# Patient Record
Sex: FEMALE | Race: WHITE | NOT HISPANIC OR LATINO | Employment: FULL TIME | URBAN - METROPOLITAN AREA
[De-identification: names, ages, dates, MRNs, and addresses within clinical notes are randomized per-mention and may not be internally consistent; named-entity substitution may affect disease eponyms.]

---

## 2017-11-17 ENCOUNTER — TRANSCRIBE ORDERS (OUTPATIENT)
Dept: ADMINISTRATIVE | Facility: HOSPITAL | Age: 62
End: 2017-11-17

## 2017-11-17 ENCOUNTER — HOSPITAL ENCOUNTER (OUTPATIENT)
Dept: RADIOLOGY | Facility: HOSPITAL | Age: 62
Discharge: HOME | End: 2017-11-17
Payer: COMMERCIAL

## 2017-11-17 DIAGNOSIS — M54.5 LOW BACK PAIN, UNSPECIFIED BACK PAIN LATERALITY, UNSPECIFIED CHRONICITY, WITH SCIATICA PRESENCE UNSPECIFIED: ICD-10-CM

## 2017-11-17 DIAGNOSIS — M53.1 CERVICOBRACHIAL SYNDROME: ICD-10-CM

## 2017-11-17 DIAGNOSIS — R20.2 TINGLING: Primary | ICD-10-CM

## 2017-11-17 DIAGNOSIS — R20.2 TINGLING: ICD-10-CM

## 2017-11-17 PROCEDURE — 73522 X-RAY EXAM HIPS BI 3-4 VIEWS: CPT

## 2017-11-17 PROCEDURE — 72100 X-RAY EXAM L-S SPINE 2/3 VWS: CPT

## 2017-11-17 PROCEDURE — 72052 X-RAY EXAM NECK SPINE 6/>VWS: CPT

## 2020-01-31 ENCOUNTER — OFFICE VISIT (OUTPATIENT)
Dept: URGENT CARE | Facility: CLINIC | Age: 65
End: 2020-01-31
Payer: COMMERCIAL

## 2020-01-31 VITALS
RESPIRATION RATE: 20 BRPM | DIASTOLIC BLOOD PRESSURE: 86 MMHG | HEART RATE: 78 BPM | SYSTOLIC BLOOD PRESSURE: 110 MMHG | TEMPERATURE: 97.3 F | OXYGEN SATURATION: 99 % | BODY MASS INDEX: 24.4 KG/M2 | HEIGHT: 68 IN | WEIGHT: 161 LBS

## 2020-01-31 DIAGNOSIS — J01.00 ACUTE NON-RECURRENT MAXILLARY SINUSITIS: Primary | ICD-10-CM

## 2020-01-31 PROCEDURE — 99213 OFFICE O/P EST LOW 20 MIN: CPT | Performed by: NURSE PRACTITIONER

## 2020-01-31 RX ORDER — AMOXICILLIN AND CLAVULANATE POTASSIUM 875; 125 MG/1; MG/1
1 TABLET, FILM COATED ORAL EVERY 12 HOURS SCHEDULED
Qty: 14 TABLET | Refills: 0 | Status: SHIPPED | OUTPATIENT
Start: 2020-01-31 | End: 2020-02-07

## 2020-01-31 RX ORDER — FLUTICASONE PROPIONATE 50 MCG
2 SPRAY, SUSPENSION (ML) NASAL DAILY
COMMUNITY
Start: 2012-03-12

## 2020-01-31 NOTE — PATIENT INSTRUCTIONS
Augmentin twice a day for 7 days  Flonase 1 spray each nostril daily  Saline nasal spray as directed to rinse sinus passages  Pseudoephedrine 1-2 tablets every 6 hours as needed for congestion  Increase your fluid intake  Tylenol and/or Motrin as needed for pain or fever  Follow up with your PCP for worsening or concerning symptoms    Sinusitis   WHAT YOU NEED TO KNOW:   Sinusitis is inflammation or infection of your sinuses  It is most often caused by a virus  Acute sinusitis may last up to 12 weeks  Chronic sinusitis lasts longer than 12 weeks  Recurrent sinusitis means you have 4 or more times in 1 year  DISCHARGE INSTRUCTIONS:   Return to the emergency department if:   · Your eye and eyelid are red, swollen, and painful  · You cannot open your eye  · You have vision changes, such as double vision  · Your eyeball bulges out or you cannot move your eye  · You are more sleepy than normal, or you notice changes in your ability to think, move, or talk  · You have a stiff neck, a fever, or a bad headache  · You have swelling of your forehead or scalp  Contact your healthcare provider if:   · Your symptoms do not improve after 3 days  · Your symptoms do not go away after 10 days  · You have nausea and are vomiting  · Your nose is bleeding  · You have questions or concerns about your condition or care  Medicines: Your symptoms may go away on their own  Your healthcare provider may recommend watchful waiting for up to 10 days before starting antibiotics  You may  need any of the following:  · Acetaminophen  decreases pain and fever  It is available without a doctor's order  Ask how much to take and how often to take it  Follow directions  Read the labels of all other medicines you are using to see if they also contain acetaminophen, or ask your doctor or pharmacist  Acetaminophen can cause liver damage if not taken correctly   Do not use more than 4 grams (4,000 milligrams) total of acetaminophen in one day  · NSAIDs , such as ibuprofen, help decrease swelling, pain, and fever  This medicine is available with or without a doctor's order  NSAIDs can cause stomach bleeding or kidney problems in certain people  If you take blood thinner medicine, always ask your healthcare provider if NSAIDs are safe for you  Always read the medicine label and follow directions  · Nasal steroid sprays  may help decrease inflammation in your nose and sinuses  · Decongestants  help reduce swelling and drain mucus in the nose and sinuses  They may help you breathe easier  · Antihistamines  help dry mucus in the nose and relieve sneezing  · Antibiotics  help treat or prevent a bacterial infection  · Take your medicine as directed  Contact your healthcare provider if you think your medicine is not helping or if you have side effects  Tell him or her if you are allergic to any medicine  Keep a list of the medicines, vitamins, and herbs you take  Include the amounts, and when and why you take them  Bring the list or the pill bottles to follow-up visits  Carry your medicine list with you in case of an emergency  Self-care:   · Rinse your sinuses  Use a sinus rinse device to rinse your nasal passages with a saline (salt water) solution or distilled water  Do not use tap water  This will help thin the mucus in your nose and rinse away pollen and dirt  It will also help reduce swelling so you can breathe normally  Ask your healthcare provider how often to do this  · Breathe in steam   Heat a bowl of water until you see steam  Lean over the bowl and make a tent over your head with a large towel  Breathe deeply for about 20 minutes  Be careful not to get too close to the steam or burn yourself  Do this 3 times a day  You can also breathe deeply when you take a hot shower  · Sleep with your head elevated    Place an extra pillow under your head before you go to sleep to help your sinuses drain      · Drink liquids as directed  Ask your healthcare provider how much liquid to drink each day and which liquids are best for you  Liquids will thin the mucus in your nose and help it drain  Avoid drinks that contain alcohol or caffeine  · Do not smoke, and avoid secondhand smoke  Nicotine and other chemicals in cigarettes and cigars can make your symptoms worse  Ask your healthcare provider for information if you currently smoke and need help to quit  E-cigarettes or smokeless tobacco still contain nicotine  Talk to your healthcare provider before you use these products  Prevent the spread of germs that cause sinusitis:  Wash your hands often with soap and water  Wash your hands after you use the bathroom, change a child's diaper, or sneeze  Wash your hands before you prepare or eat food  Follow up with your healthcare provider as directed: You may be referred to an ear, nose, and throat specialist  Write down your questions so you remember to ask them during your visits  © 2017 [facility] Information is for End User's use only and may not be sold, redistributed or otherwise used for commercial purposes  All illustrations and images included in CareNotes® are the copyrighted property of [employer]  or Victorino Serrano  The above information is an  only  It is not intended as medical advice for individual conditions or treatments  Talk to your doctor, nurse or pharmacist before following any medical regimen to see if it is safe and effective for you

## 2020-01-31 NOTE — PROGRESS NOTES
[facility]        NAME: Sanjay Manrique is a 71 y o  female  : 1955    MRN: [id number]  DATE: 2020  TIME: 11:14 AM    Assessment and Plan   Acute non-recurrent maxillary sinusitis [J01 00]  1  Acute non-recurrent maxillary sinusitis  amoxicillin-clavulanate (AUGMENTIN) 875-125 mg per tablet         Patient Instructions   Augmentin twice a day for 7 days  Flonase 1 spray each nostril daily  Saline nasal spray as directed to rinse sinus passages  Pseudoephedrine 1-2 tablets every 6 hours as needed for congestion  Increase your fluid intake  Tylenol and/or Motrin as needed for pain or fever  Follow up with your PCP for worsening or concerning symptoms      Follow up with PCP in 3-5 days  Proceed to  ER if symptoms worsen  Chief Complaint     Chief Complaint   Patient presents with    Nasal Congestion     x 3 weeks    Sinusitis     x 3 weeks, tried aleve d, sudafed d, mucinex, flonase, and advil    Sore Throat    Headache         History of Present Illness       Patient is a 79-year-old female presenting with a 3 week history of congestion, rhinorrhea, sore throat, headache, and alternating bilateral ear aches  She has a cough that is occasionally moist and productive with green sputum  Denies fever or chills  He has been using over-the-counter medications including Aleve, Sudafed, Mucinex, Advil cold and Sinus without improvement  Review of Systems   Review of Systems   Constitutional: Negative for activity change, chills and fever  HENT: Positive for ear pain, postnasal drip, rhinorrhea, sinus pressure, sinus pain and sore throat  Negative for ear discharge  Respiratory: Positive for cough  Negative for shortness of breath  Gastrointestinal: Negative for abdominal pain, diarrhea, nausea and vomiting  Skin: Negative for rash  Neurological: Positive for headaches           Current Medications       Current Outpatient Medications:     fluticasone (FLONASE) 50 mcg/act nasal spray, 2 sprays into each nostril daily, Disp: , Rfl:     amoxicillin-clavulanate (AUGMENTIN) 875-125 mg per tablet, Take 1 tablet by mouth every 12 (twelve) hours for 7 days, Disp: 14 tablet, Rfl: 0    Current Allergies     Allergies as of 01/31/2020 - Reviewed 01/31/2020   Allergen Reaction Noted    Sulfa antibiotics  07/16/2014            The following portions of the patient's history were reviewed and updated as appropriate: allergies, current medications, past family history, past medical history, past social history, past surgical history and problem list      History reviewed  No pertinent past medical history  Past Surgical History:   Procedure Laterality Date    KNEE SURGERY Left     SHOULDER SURGERY Right        History reviewed  No pertinent family history  Medications have been verified  Objective   /86   Pulse 78   Temp (!) 97 3 °F (36 3 °C)   Resp 20   Ht 5' 8" (1 727 m)   Wt 73 kg (161 lb)   SpO2 99%   BMI 24 48 kg/m²        Physical Exam     Physical Exam   Constitutional: She is oriented to person, place, and time  Vital signs are normal  She appears well-developed and well-nourished  She is active  She does not appear ill  HENT:   Head: Normocephalic  Right Ear: Hearing, tympanic membrane, external ear and ear canal normal    Left Ear: Hearing, tympanic membrane, external ear and ear canal normal    Nose: Mucosal edema and rhinorrhea present  Right sinus exhibits maxillary sinus tenderness  Left sinus exhibits maxillary sinus tenderness  Mouth/Throat: Uvula is midline, oropharynx is clear and moist and mucous membranes are normal    Cardiovascular: Normal rate, regular rhythm, S1 normal, S2 normal and normal heart sounds  Pulmonary/Chest: Effort normal and breath sounds normal  No respiratory distress  She has no decreased breath sounds  She has no wheezes  She has no rhonchi  She has no rales     Neurological: She is alert and oriented to person, place, and time  She is not disoriented  Skin: Skin is warm, dry and intact

## 2021-09-20 ENCOUNTER — APPOINTMENT (EMERGENCY)
Dept: RADIOLOGY | Facility: HOSPITAL | Age: 66
End: 2021-09-20
Payer: COMMERCIAL

## 2021-09-20 ENCOUNTER — APPOINTMENT (EMERGENCY)
Dept: CT IMAGING | Facility: HOSPITAL | Age: 66
End: 2021-09-20
Payer: COMMERCIAL

## 2021-09-20 ENCOUNTER — HOSPITAL ENCOUNTER (EMERGENCY)
Facility: HOSPITAL | Age: 66
Discharge: HOME | End: 2021-09-20
Attending: EMERGENCY MEDICINE
Payer: COMMERCIAL

## 2021-09-20 VITALS
SYSTOLIC BLOOD PRESSURE: 121 MMHG | RESPIRATION RATE: 18 BRPM | DIASTOLIC BLOOD PRESSURE: 73 MMHG | HEART RATE: 66 BPM | OXYGEN SATURATION: 96 % | TEMPERATURE: 98.1 F

## 2021-09-20 DIAGNOSIS — S22.20XA: ICD-10-CM
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
## 2021-09-20 LAB
ANION GAP SERPL CALCULATED.3IONS-SCNC: 9 MMOL/L (ref 4–13)
ATRIAL RATE: 60 BPM
BUN SERPL-MCNC: 21 MG/DL (ref 5–25)
CALCIUM SERPL-MCNC: 8.6 MG/DL (ref 8.3–10.1)
CHLORIDE SERPL-SCNC: 108 MMOL/L (ref 100–108)
CO2 SERPL-SCNC: 25 MMOL/L (ref 21–32)
CREAT SERPL-MCNC: 0.65 MG/DL (ref 0.6–1.3)
GFR SERPL CREATININE-BSD FRML MDRD: 93 ML/MIN/1.73SQ M
GLUCOSE SERPL-MCNC: 109 MG/DL (ref 65–140)
P AXIS: 53 DEGREES
POTASSIUM SERPL-SCNC: 4.3 MMOL/L (ref 3.5–5.3)
PR INTERVAL: 196 MS
QRS AXIS: -22 DEGREES
QRSD INTERVAL: 82 MS
QT INTERVAL: 402 MS
QTC INTERVAL: 402 MS
SODIUM SERPL-SCNC: 142 MMOL/L (ref 136–145)
T WAVE AXIS: -17 DEGREES
TROPONIN I SERPL-MCNC: <0.02 NG/ML
VENTRICULAR RATE: 60 BPM

## 2021-09-20 PROCEDURE — 84484 ASSAY OF TROPONIN QUANT: CPT | Performed by: EMERGENCY MEDICINE

## 2021-09-20 PROCEDURE — 99285 EMERGENCY DEPT VISIT HI MDM: CPT

## 2021-09-20 PROCEDURE — 80048 BASIC METABOLIC PNL TOTAL CA: CPT | Performed by: EMERGENCY MEDICINE

## 2021-09-20 PROCEDURE — 71275 CT ANGIOGRAPHY CHEST: CPT

## 2021-09-20 PROCEDURE — 93010 ELECTROCARDIOGRAM REPORT: CPT | Performed by: INTERNAL MEDICINE

## 2021-09-20 PROCEDURE — 71120 X-RAY EXAM BREASTBONE 2/>VWS: CPT

## 2021-09-20 PROCEDURE — 93005 ELECTROCARDIOGRAM TRACING: CPT

## 2021-09-20 PROCEDURE — 71046 X-RAY EXAM CHEST 2 VIEWS: CPT

## 2021-09-20 PROCEDURE — 96365 THER/PROPH/DIAG IV INF INIT: CPT

## 2021-09-20 PROCEDURE — 36415 COLL VENOUS BLD VENIPUNCTURE: CPT | Performed by: EMERGENCY MEDICINE

## 2021-09-20 PROCEDURE — G1004 CDSM NDSC: HCPCS

## 2021-09-20 PROCEDURE — 99285 EMERGENCY DEPT VISIT HI MDM: CPT | Performed by: EMERGENCY MEDICINE

## 2021-09-20 RX ORDER — ACETAMINOPHEN 325 MG/1
650 TABLET ORAL ONCE
Status: COMPLETED | OUTPATIENT
Start: 2021-09-20 | End: 2021-09-20

## 2021-09-20 RX ORDER — OXYCODONE HYDROCHLORIDE 5 MG/1
5 CAPSULE ORAL EVERY 6 HOURS PRN
Qty: 10 CAPSULE | Refills: 0 | Status: SHIPPED | OUTPATIENT
Start: 2021-09-20

## 2021-09-20 RX ADMIN — IOHEXOL 85 ML: 350 INJECTION, SOLUTION INTRAVENOUS at 09:57

## 2021-09-20 RX ADMIN — SODIUM CHLORIDE, SODIUM LACTATE, POTASSIUM CHLORIDE, AND CALCIUM CHLORIDE 500 ML: .6; .31; .03; .02 INJECTION, SOLUTION INTRAVENOUS at 09:03

## 2021-09-20 RX ADMIN — ACETAMINOPHEN 650 MG: 325 TABLET, FILM COATED ORAL at 07:39
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
## 2021-09-20 NOTE — ED PROCEDURE NOTE
PROCEDURE  ECG 12 Lead Documentation Only    Date/Time: 9/20/2021 9:22 AM  Performed by: Chloe Wagoner MD  Authorized by: Chloe Wagoner MD     Indications / Diagnosis:  Blunt sternal pain   ECG reviewed by me, the ED Provider: yes    Patient location:  ED and bedside  Previous ECG:     Previous ECG:  Unavailable    Comparison to cardiac monitor: Yes    Interpretation:     Interpretation: non-specific    Rate:     ECG rate:  60    ECG rate assessment: normal    Rhythm:     Rhythm: sinus rhythm    Ectopy:     Ectopy: none    QRS:     QRS axis:  Normal    QRS intervals:  Normal  Conduction:     Conduction: abnormal      Abnormal conduction: incomplete RBBB    ST segments:     ST segments:  Normal  T waves:     T waves: flattening      Flattening:  III, aVF and V1  Q waves:     Q waves:  V1  Other findings:     Other findings: U wave    Comments:      No signs of frederick/pericardial injury          Chloe Wagoner MD  09/20/21 1061

## 2021-09-20 NOTE — Clinical Note
Allison Carlos was seen and treated in our emergency department on 9/20/2021  Diagnosis:     Elizabeth Hassan  may return to work on return date  She may return on this date: 09/27/2021         If you have any questions or concerns, please don't hesitate to call        Edgar Sosa MD    ______________________________           _______________          _______________  Hospital Representative                              Date                                Time
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
## 2023-02-23 ENCOUNTER — OFFICE VISIT (OUTPATIENT)
Dept: VASCULAR SURGERY | Facility: CLINIC | Age: 68
End: 2023-02-23

## 2023-02-23 VITALS
BODY MASS INDEX: 26.89 KG/M2 | DIASTOLIC BLOOD PRESSURE: 100 MMHG | HEIGHT: 68 IN | HEART RATE: 73 BPM | OXYGEN SATURATION: 98 % | WEIGHT: 177.4 LBS | SYSTOLIC BLOOD PRESSURE: 156 MMHG

## 2023-02-23 DIAGNOSIS — I73.9 PVD (PERIPHERAL VASCULAR DISEASE) (HCC): Primary | ICD-10-CM

## 2023-02-23 PROBLEM — I83.812 VARICOSE VEINS OF LEFT LOWER EXTREMITY WITH PAIN: Status: ACTIVE | Noted: 2023-02-23

## 2023-02-23 PROBLEM — I87.2 VENOUS STASIS DERMATITIS OF BOTH LOWER EXTREMITIES: Status: ACTIVE | Noted: 2023-02-23

## 2023-02-23 RX ORDER — TIMOLOL MALEATE 5 MG/ML
SOLUTION/ DROPS OPHTHALMIC
COMMUNITY
Start: 2023-02-20

## 2023-02-23 RX ORDER — MOMETASONE FUROATE 50 UG/1
2 SPRAY, METERED NASAL DAILY
COMMUNITY

## 2023-02-23 NOTE — PATIENT INSTRUCTIONS
- Call the office if you experience any changes to your legs or feet such as new pain, redness,swelling,    or fever    - Complete arterial duplex and the office will call to determine if you need to follow up in the office      - Call family doctor for lipid panel  4 = No assist / stand by assistance

## 2023-02-23 NOTE — PROGRESS NOTES
Assessment/Plan:    PVD (peripheral vascular disease) [facility])  58-year-old female former smoker new to the practice, self-referred presents with b/l leg pain  States she can walk 1/4 a mile and has aching in b/l legs from the knee down her leg and is unable to keep walking  C/O pain in the morning when waking up, described as an ache that goes away with increased walking  No motor or sensory loss  Veinous stasis dermatitis present on b/l legs with varicose veins  Plan  -Complete arterial duplex and the office will call for follow up office visit/ new testing    -Establish care with a family doctor and have lipid panel done with goal of LDL to be less than 100   -Encouraged to continue exercise as much as possible   -Call the office with any increase in leg pain, swelling, leg discoloration   - Continue use of compression  Diagnoses and all orders for this visit:    PVD (peripheral vascular disease) ([facility] [place] [address] )  -     VAS lower limb arterial duplex, complete bilateral; Future    Other orders  -     timolol (TIMOPTIC) 0 5 % ophthalmic solution  -     mometasone (NASONEX) 50 mcg/act nasal spray; 2 sprays into each nostril daily          Subjective:      Patient ID: Hugh Esposito is a 75 y o  female  Patient is new to our office  She c/o bulging, painful veins that cause her legs to ache  Her left leg is worse  She says that the left leg aches, swells and itches  Patient has been wearing compression stockings for years  Patient is a former smoker  58-year-old former smoker who is new to the office and is self referred for leg pain states her legs started to bother her around the holidays of last year, she noticed an increase in swelling and legs became itchy  Has noticed an increase in leg tiredness with walking and walking up and down the stairs  Has been wearing a light compression everyday; however unsure of the strength  States when she wears tight compression her legs ache   Wakes up with aching legs and when she walks around her pain decreases  Denies numbness or tingling in feet or legs  Exercises every day at home  Quit smoking 7 year ago, ~6 cig/ day for 5-6 years  Denies family history of heart attack or stroke  The following portions of the patient's history were reviewed and updated as appropriate: allergies, current medications, past family history, past medical history, past social history, past surgical history and problem list     Review of Systems   Constitutional: Negative  HENT: Negative  Eyes: Negative  Respiratory: Negative  Negative for shortness of breath  Cardiovascular: Negative for chest pain  Painful veins   Gastrointestinal: Negative  Endocrine: Negative  Genitourinary: Negative  Musculoskeletal: Positive for back pain  Skin: Positive for color change  Allergic/Immunologic: Negative  Neurological: Negative  Hematological: Negative  Psychiatric/Behavioral: Negative  Objective:      /100 (BP Location: Right arm, Patient Position: Sitting, Cuff Size: Standard)   Pulse 73   Ht 5' 8" (1 727 m)   Wt 80 5 kg (177 lb 6 4 oz)   SpO2 98%   BMI 26 97 kg/m²          Physical Exam  Constitutional:       Appearance: Normal appearance  She is obese  HENT:      Head: Normocephalic and atraumatic  Cardiovascular:      Rate and Rhythm: Normal rate and regular rhythm  Pulses:           Dorsalis pedis pulses are detected w/ Doppler on the right side and 2+ on the left side  Posterior tibial pulses are 1+ on the right side and 1+ on the left side  Heart sounds: Normal heart sounds  No murmur heard  Pulmonary:      Effort: Pulmonary effort is normal  No respiratory distress  Breath sounds: Normal breath sounds  Abdominal:      Palpations: Abdomen is soft  Tenderness: There is no abdominal tenderness  Musculoskeletal:         General: No tenderness or signs of injury        Cervical back: Normal range of motion  Right lower le+ Edema present  Left lower le+ Edema present  Skin:     General: Skin is warm and dry  Neurological:      General: No focal deficit present  Mental Status: She is alert and oriented to person, place, and time  Psychiatric:         Mood and Affect: Mood normal          Behavior: Behavior normal          I have reviewed and made appropriate changes to the review of systems input by the medical assistant  Vitals:    23 1402   BP: 156/100   BP Location: Right arm   Patient Position: Sitting   Cuff Size: Standard   Pulse: 73   SpO2: 98%   Weight: 80 5 kg (177 lb 6 4 oz)   Height: 5' 8" (1 727 m)       Patient Active Problem List   Diagnosis   • PVD (peripheral vascular disease) ([facility])   • Venous stasis dermatitis of both lower extremities   • Varicose veins of left lower extremity with pain       Past Surgical History:   Procedure Laterality Date   • KNEE SURGERY Left    • SHOULDER SURGERY Right        History reviewed  No pertinent family history      Social History     Socioeconomic History   • Marital status: /Civil Union     Spouse name: Not on file   • Number of children: Not on file   • Years of education: Not on file   • Highest education level: Not on file   Occupational History   • Not on file   Tobacco Use   • Smoking status: Former   • Smokeless tobacco: Never   Vaping Use   • Vaping Use: Never used   Substance and Sexual Activity   • Alcohol use: Yes     Comment: wine at night   • Drug use: Never   • Sexual activity: Not on file   Other Topics Concern   • Not on file   Social History Narrative   • Not on file     Social Determinants of Health     Financial Resource Strain: Not on file   Food Insecurity: Not on file   Transportation Needs: Not on file   Physical Activity: Not on file   Stress: Not on file   Social Connections: Not on file   Intimate Partner Violence: Not on file   Housing Stability: Not on file Allergies   Allergen Reactions   • Sulfa Antibiotics          Current Outpatient Medications:   •  mometasone (NASONEX) 50 mcg/act nasal spray, 2 sprays into each nostril daily, Disp: , Rfl:   •  timolol (TIMOPTIC) 0 5 % ophthalmic solution, , Disp: , Rfl:   •  fluticasone (FLONASE) 50 mcg/act nasal spray, 2 sprays into each nostril daily (Patient not taking: Reported on 2/23/2023), Disp: , Rfl:   •  oxyCODONE (OXY-IR) 5 MG capsule, Take 1 capsule (5 mg total) by mouth every 6 (six) hours as needed for severe pain for up to 10 doses CAN SUBSTITUTE OXYCODONE TABLETS FOR CAPSULESMax Daily Amount: 20 mg (Patient not taking: Reported on 2/23/2023), Disp: 10 capsule, Rfl: 0  I have spent a total time of 30 minutes on 02/23/23 in caring for this patient including Instructions for management, Patient and family education, Reviewing / ordering tests, medicine, procedures   and Obtaining or reviewing history

## 2023-03-17 ENCOUNTER — HOSPITAL ENCOUNTER (OUTPATIENT)
Dept: NON INVASIVE DIAGNOSTICS | Facility: CLINIC | Age: 68
Discharge: HOME | End: 2023-03-17

## 2023-03-17 DIAGNOSIS — I73.9 PVD (PERIPHERAL VASCULAR DISEASE) (HCC): ICD-10-CM

## 2023-05-12 NOTE — PROGRESS NOTES
Assessment/Plan:      PVD (peripheral vascular disease) ([facility] [address] )  Varicose veins of left lower extremity with pain  Venous stasis dermatitis of both lower extremities  -     Compression Stocking  -     Compression Stocking    -Painful, very tired, heavy and swelling legs    -CHET 3/24/23: no LE arterial occlusive disease in the femoral or tibials  R 1 2/210/97, L 1 16/182/115    -Exam: 1+ B LE edema at the ankles with mild chronic statis changes  Skin without breakdown  2+ distal pulses  Plan: We had a detailed discussion regarding varicose veins and the treatment of venous disease  We discussed the role of compression and other conservative measures for relief of symptoms related to varicose veins  She had a lower extremity arterial duplex study which shows no evidence of arterial occlusive disease  Based on her examination, she has pulses in the feet  Her exam is consistent with evidence of chronic venous insufficiency  We discussed that her symptoms and exam suggest that she would benefit from compression stockings  Recommend compression stockings to be worn daily, regular exercise and weight loss  Otherwise, no surgical intervention is needed at this time  It is recommended that she follow-up after 3-month trial of compression stockings, if she continues to have symptoms       - Order for prescription graded compression stockings of 15-20 and 20-30 mm Hg  - Compression, periodic elevation, regular exercise for weight loss  - Patient education for venous insufficiency  - Follow up in 1 year or sooner, if needed      Subjective:      Patient ID: Chetan Mendoza is a 75 y o  female  Patient present to review CHET done on 3/17/23  Patient c/o pain on the bottom of both feet in the morning  Patient does not take any medications pertaining to us  HPI    Chetan Mendoza 04NV F former smoker varicose veins and venous insufficiency referred for bilateral leg pain   Patient with pain and aching in the legs from the knees down after walking 1/4 mile  5/14/23: Patient knew to me but recently seen by other provider on 2/23/23 for leg pain with walking  She returns for recheck and review of CHET  Patient complains of tired, heavy swelling legs during the day which worsens when she walks  She has managed to increase her activity and actually walked 3 miles yesterday  Her legs felt reasonably well  However, once her legs are tired, they can be tired and heavy for the rest of the day  In the mornings, she wakes up with some discomfort in the feet which resolves when she gets up and walks around  She asks about alternative therapies such as red light and vibration therapy to the feet  She had a lower extremity arterial duplex study which shows no evidence of arterial occlusive disease  Based on her examination, she has pulses in the feet  Her exam is consistent with evidence of chronic venous insufficiency  We discussed that her symptoms and exam suggest that she would benefit from compression stockings  Recommend compression stockings to be worn daily, regular exercise and weight loss  Otherwise, no surgical intervention is needed at this time  CHET 3/24/23  CLINICAL:  Indications:  Patient presents with bilateral lower extremity aching, worse  when ambulating on stairs  Operative History:  No prior cardiovascular surgeries  Risk Factors  The patient has history of Varicose veins, Venous stasis dermatitis and  previous smoking (quit 5-10yrs ago)  Clinical  Right Pressure: 137/ mm Hg, Left Pressure: 134/ mm Hg  FINDINGS:     Segment                Right                  Left                                            Waveform   PSV (cm/s)  Waveform   PSV (cm/s)    Post  Tibial           Triphasic              Triphasic                Ant   Tibial            Triphasic              Triphasic                Common Femoral Artery                    120                    105    Prox Profunda 79                     72    Prox SFA                                  97                     87    Mid SFA                                   96                    103    Dist SFA                                  62                     56    Proximal Pop                              45                     55    Distal Pop                                74                     67    Tibioperoneal                             53                     91    Dist Post Tibial                          66                     78    Prox  Ant  Tibial                         31                     43    Dist  Ant  Tibial                         44                     63    Dist Peroneal                             47                     56             CONCLUSION:     Impression:     RIGHT LOWER LIMB:  No evidence of significant lower extremity arterial occlusive disease in the  femoral or tibial arteries  Diffuse disease noted in the distal popliteal artery without focal stenosis  Ankle/Brachial index: 1 2 which is in the normal category  PVR/ PPG tracings are normal  Triphasic waveforms noted at the ankle  Metatarsal pressure of 210 mm Hg  Great toe pressure of 97 mm Hg, within the healing range  LEFT LOWER LIMB:  No evidence of significant lower extremity arterial occlusive disease in the  femoral or tibial arteries  Diffuse disease noted in the distal popliteal artery without focal stenosis  Ankle/Brachial index: 1 16 which is in the normal category  PVR/ PPG tracings are normal  Triphasic waveforms noted at the ankle  Metatarsal pressure of 182 mm Hg  Great toe pressure of 115 mm Hg, within the healing range       SIGNATURE:  Electronically Signed by: Monica Jaramillo MD on 2023-03-17 06:31:39 PM      The following portions of the patient's history were reviewed and updated as appropriate: allergies, current medications, past family history, past medical history, past social history, past "surgical history and problem list     Review of Systems   Constitutional: Negative  HENT: Negative  Eyes: Negative  Respiratory: Negative  Cardiovascular: Negative  Gastrointestinal: Negative  Endocrine: Negative  Genitourinary: Negative  Musculoskeletal: Negative  Skin: Negative  Allergic/Immunologic: Negative  Neurological: Negative  Hematological: Negative  Psychiatric/Behavioral: Negative  Objective:      /90 (BP Location: Right arm, Patient Position: Sitting, Cuff Size: Adult)   Pulse 77   Ht 5' 8\" (1 727 m)   Wt 80 kg (176 lb 4 8 oz)   BMI 26 81 kg/m²     1+ B LE edema at the ankles with mild chronic statis changes  Skin without breakdown  2+ distal pulses  Feet warm and well-perfused  Onychomycosis       Physical Exam  Constitutional:       Appearance: Normal appearance  She is obese  HENT:      Head: Normocephalic and atraumatic  Eyes:      Extraocular Movements: Extraocular movements intact  Pupils: Pupils are equal, round, and reactive to light  Cardiovascular:      Rate and Rhythm: Normal rate and regular rhythm  Pulses:           Dorsalis pedis pulses are 2+ on the left side  Posterior tibial pulses are 2+ on the right side  Pulmonary:      Effort: Pulmonary effort is normal    Musculoskeletal:      Right lower le+ Edema present  Left lower le+ Edema present  I have reviewed and made appropriate changes to the review of systems input by the medical assistant      Vitals:    05/15/23 1000   BP: 140/90   BP Location: Right arm   Patient Position: Sitting   Cuff Size: Adult   Pulse: 77   Weight: 80 kg (176 lb 4 8 oz)   Height: 5' 8\" (1 727 m)       Patient Active Problem List   Diagnosis   • PVD (peripheral vascular disease) ([facility] [address] )   • Venous stasis dermatitis of both lower extremities   • Varicose veins of left lower extremity with pain       Past Surgical History:   Procedure Laterality Date   • " KNEE SURGERY Left    • SHOULDER SURGERY Right        No family history on file      Social History     Socioeconomic History   • Marital status: /Civil Union     Spouse name: Not on file   • Number of children: Not on file   • Years of education: Not on file   • Highest education level: Not on file   Occupational History   • Not on file   Tobacco Use   • Smoking status: Former   • Smokeless tobacco: Never   Vaping Use   • Vaping Use: Never used   Substance and Sexual Activity   • Alcohol use: Yes     Comment: wine at night   • Drug use: Never   • Sexual activity: Not on file   Other Topics Concern   • Not on file   Social History Narrative   • Not on file     Social Determinants of Health     Financial Resource Strain: Not on file   Food Insecurity: Not on file   Transportation Needs: Not on file   Physical Activity: Not on file   Stress: Not on file   Social Connections: Not on file   Intimate Partner Violence: Not on file   Housing Stability: Not on file       Allergies   Allergen Reactions   • Sulfa Antibiotics          Current Outpatient Medications:   •  fluticasone (FLONASE) 50 mcg/act nasal spray, 2 sprays into each nostril daily, Disp: , Rfl:   •  mometasone (NASONEX) 50 mcg/act nasal spray, 2 sprays into each nostril daily, Disp: , Rfl:   •  oxyCODONE (OXY-IR) 5 MG capsule, Take 1 capsule (5 mg total) by mouth every 6 (six) hours as needed for severe pain for up to 10 doses CAN SUBSTITUTE OXYCODONE TABLETS FOR CAPSULESMax Daily Amount: 20 mg, Disp: 10 capsule, Rfl: 0  •  timolol (TIMOPTIC) 0 5 % ophthalmic solution, , Disp: , Rfl:

## 2023-05-15 ENCOUNTER — OFFICE VISIT (OUTPATIENT)
Dept: VASCULAR SURGERY | Facility: CLINIC | Age: 68
End: 2023-05-15

## 2023-05-15 VITALS
BODY MASS INDEX: 26.72 KG/M2 | HEART RATE: 77 BPM | HEIGHT: 68 IN | SYSTOLIC BLOOD PRESSURE: 140 MMHG | WEIGHT: 176.3 LBS | DIASTOLIC BLOOD PRESSURE: 90 MMHG

## 2023-05-15 DIAGNOSIS — I87.2 VENOUS STASIS DERMATITIS OF BOTH LOWER EXTREMITIES: ICD-10-CM

## 2023-05-15 DIAGNOSIS — I83.812 VARICOSE VEINS OF LEFT LOWER EXTREMITY WITH PAIN: ICD-10-CM

## 2023-05-15 DIAGNOSIS — I73.9 PVD (PERIPHERAL VASCULAR DISEASE) (HCC): Primary | ICD-10-CM

## 2023-06-26 NOTE — ASSESSMENT & PLAN NOTE
59-year-old female former smoker new to the practice, self-referred presents with b/l leg pain  States she can walk 1/4 a mile and has aching in b/l legs from the knee down her leg and is unable to keep walking  C/O pain in the morning when waking up, described as an ache that goes away with increased walking  No motor or sensory loss  Veinous stasis dermatitis present on b/l legs with varicose veins  Plan  -Complete arterial duplex and the office will call for follow up office visit/ new testing    -Establish care with a family doctor and have lipid panel done with goal of LDL to be less than 100   -Encouraged to continue exercise as much as possible   -Call the office with any increase in leg pain, swelling, leg discoloration   - Continue use of compression  X Size Of Lesion In Cm (Optional): 1.5

## 2024-11-11 ENCOUNTER — OFFICE VISIT (OUTPATIENT)
Dept: FAMILY MEDICINE CLINIC | Facility: CLINIC | Age: 69
End: 2024-11-11
Payer: MEDICARE

## 2024-11-11 VITALS
HEART RATE: 70 BPM | WEIGHT: 177.6 LBS | OXYGEN SATURATION: 96 % | TEMPERATURE: 97.1 F | DIASTOLIC BLOOD PRESSURE: 64 MMHG | SYSTOLIC BLOOD PRESSURE: 136 MMHG | BODY MASS INDEX: 26.92 KG/M2 | HEIGHT: 68 IN

## 2024-11-11 DIAGNOSIS — G62.9 NEUROPATHY: ICD-10-CM

## 2024-11-11 DIAGNOSIS — G89.29 CHRONIC PAIN OF RIGHT KNEE: ICD-10-CM

## 2024-11-11 DIAGNOSIS — R20.9 UNSPECIFIED DISTURBANCES OF SKIN SENSATION: ICD-10-CM

## 2024-11-11 DIAGNOSIS — G25.81 RLS (RESTLESS LEGS SYNDROME): ICD-10-CM

## 2024-11-11 DIAGNOSIS — M25.561 CHRONIC PAIN OF RIGHT KNEE: ICD-10-CM

## 2024-11-11 DIAGNOSIS — I73.9 CLAUDICATION OF BOTH LOWER EXTREMITIES (HCC): Primary | ICD-10-CM

## 2024-11-11 DIAGNOSIS — Z13.6 SCREENING FOR CARDIOVASCULAR CONDITION: ICD-10-CM

## 2024-11-11 DIAGNOSIS — E53.8 B12 DEFICIENCY: ICD-10-CM

## 2024-11-11 PROBLEM — M17.11 PRIMARY OSTEOARTHRITIS OF RIGHT KNEE: Status: ACTIVE | Noted: 2021-08-17

## 2024-11-11 PROBLEM — H40.10X0 OPEN-ANGLE GLAUCOMA OF BOTH EYES: Status: ACTIVE | Noted: 2024-11-11

## 2024-11-11 PROCEDURE — 99204 OFFICE O/P NEW MOD 45 MIN: CPT | Performed by: FAMILY MEDICINE

## 2024-11-11 RX ORDER — GABAPENTIN 100 MG/1
100 CAPSULE ORAL
Qty: 30 CAPSULE | Refills: 0 | Status: SHIPPED | OUTPATIENT
Start: 2024-11-11

## 2024-11-11 NOTE — PROGRESS NOTES
Ambulatory Visit  Name: Guillermina Cleveland      : 1955      MRN: [id number]  Encounter Provider: Endy Padilla MD  Encounter Date: 2024   Encounter department: [facility] [place]    69-year-old female who presents with bilateral lower extremity pain.  Symptoms described suggest claudication.  Did have an arterial study in the past that was normal but this was 2 years ago.  Will repeat the study.  I have also ordered labs.  Gabapentin ordered for possible radiculopathy versus neuropathy.  It where she she only take it at night as it can cause drowsiness.  Discussed her mood which she expressed feeling depressed secondary to the pain.  Patient will return in 1 month for Medicare wellness and to follow-up concerns addressed today  Assessment & Plan  Claudication of both lower extremities (HCC)    Orders:    VAS ARTERIAL DUPLEX- LOWER LIMB BILATERAL; Future    Comprehensive metabolic panel; Future    Magnesium; Future    Vitamin B12; Future    RLS (restless legs syndrome)    Orders:    Iron Panel (Includes Ferritin, Iron Sat%, Iron, and TIBC); Future    Neuropathy    Orders:    Comprehensive metabolic panel; Future    Magnesium; Future    Vitamin B12; Future    Iron Panel (Includes Ferritin, Iron Sat%, Iron, and TIBC); Future    gabapentin (Neurontin) 100 mg capsule; Take 1 capsule (100 mg total) by mouth daily at bedtime    Unspecified disturbances of skin sensation    Orders:    Vitamin B12; Future    Iron Panel (Includes Ferritin, Iron Sat%, Iron, and TIBC); Future    Screening for cardiovascular condition    Orders:    Lipid panel; Future    Chronic pain of right knee    Orders:    Iron Panel (Includes Ferritin, Iron Sat%, Iron, and TIBC); Future    B12 deficiency           Depression Screening and Follow-up Plan: Patient's depression screening was positive with a PHQ-2 score of 4. Their PHQ-9 score was 22. Depression likely due to other medical condition. Will treat underlying condition.        History of Present Illness     69-year-old female with peripheral vascular disease, venous stasis, arthritis of right knee, and glaucoma here as a new patient to discuss bilateral leg pain.  Pain is chronic and affects both calves but has gotten worse since the summer.  Before the pain would occur once in a while but is now happening more frequently.  This is affecting her gait and her mood.  She is attending PT and has an appointment this afternoon.  She describes it as muscle tightness but is not relieved with stretching.  She has tried performing the exercises PT taught her but does not feel that it helps.  Feels that her calves are chronically swollen.  She wears compression stockings without relief.  She has cut out salt.  Also eliminated bread and rice and mostly consuming a Mediterranean diet.  Pain is worse with walking but sometimes affects her at rest.  Has snapping right hip that started 2 weeks ago.          History obtained from : patient  Review of Systems   Cardiovascular: Negative.    Gastrointestinal: Negative.    Musculoskeletal:  Positive for gait problem and myalgias. Negative for joint swelling.        Bilateral calf pain and right hip pain   Psychiatric/Behavioral:  Positive for agitation, dysphoric mood and sleep disturbance.      Medical History Reviewed by provider this encounter:  Surg Hx       Past Medical History   History reviewed. No pertinent past medical history.  Past Surgical History:   Procedure Laterality Date    KNEE SURGERY Left     SHOULDER SURGERY Right      History reviewed. No pertinent family history.  Current Outpatient Medications on File Prior to Visit   Medication Sig Dispense Refill    fluticasone (FLONASE) 50 mcg/act nasal spray 2 sprays into each nostril daily      mometasone (NASONEX) 50 mcg/act nasal spray 2 sprays into each nostril daily      timolol (TIMOPTIC) 0.5 % ophthalmic solution        No current facility-administered medications on file prior to  "visit.     Allergies   Allergen Reactions    Sulfa Antibiotics       Current Outpatient Medications on File Prior to Visit   Medication Sig Dispense Refill    fluticasone (FLONASE) 50 mcg/act nasal spray 2 sprays into each nostril daily      mometasone (NASONEX) 50 mcg/act nasal spray 2 sprays into each nostril daily      timolol (TIMOPTIC) 0.5 % ophthalmic solution        No current facility-administered medications on file prior to visit.      Social History     Tobacco Use    Smoking status: Former    Smokeless tobacco: Never    Tobacco comments:     2018. On and off 1-2 cigarettes a day but sometimes not   Vaping Use    Vaping status: Never Used   Substance and Sexual Activity    Alcohol use: Yes     Comment: wine at night on weekends    Drug use: Never    Sexual activity: Not Currently         Objective     /64 (BP Location: Left arm, Patient Position: Sitting, Cuff Size: Large)   Pulse 70   Temp (!) 97.1 °F (36.2 °C) (Tympanic)   Ht 5' 8\" (1.727 m)   Wt 80.6 kg (177 lb 9.6 oz)   SpO2 96%   BMI 27.00 kg/m²     Physical Exam  Vitals reviewed.   Constitutional:       General: She is not in acute distress.     Appearance: Normal appearance. She is not toxic-appearing.   HENT:      Head: Normocephalic and atraumatic.      Right Ear: Tympanic membrane normal.      Left Ear: Tympanic membrane normal.      Mouth/Throat:      Mouth: Mucous membranes are moist.   Cardiovascular:      Rate and Rhythm: Normal rate and regular rhythm.      Heart sounds: No murmur heard.  Pulmonary:      Effort: Pulmonary effort is normal. No respiratory distress.      Breath sounds: Normal breath sounds. No stridor. No wheezing, rhonchi or rales.   Abdominal:      General: There is no distension.      Palpations: Abdomen is soft. There is no mass.      Tenderness: There is no abdominal tenderness. There is no guarding or rebound.      Hernia: No hernia is present.   Musculoskeletal:      Right lower leg: No edema.      Left " lower leg: No edema.   Skin:     General: Skin is warm.   Neurological:      Mental Status: She is alert.      Gait: Gait abnormal.   Psychiatric:         Attention and Perception: Attention normal.         Mood and Affect: Affect is blunt and tearful.         Behavior: Behavior is agitated. Behavior is cooperative.         Thought Content: Thought content normal.         Cognition and Memory: Cognition and memory normal.

## 2024-11-15 ENCOUNTER — HOSPITAL ENCOUNTER (OUTPATIENT)
Dept: NON INVASIVE DIAGNOSTICS | Facility: CLINIC | Age: 69
Discharge: HOME | End: 2024-11-15
Payer: MEDICARE

## 2024-11-15 ENCOUNTER — APPOINTMENT (OUTPATIENT)
Dept: LAB | Facility: HOSPITAL | Age: 69
End: 2024-11-15
Payer: MEDICARE

## 2024-11-15 DIAGNOSIS — R20.9 UNSPECIFIED DISTURBANCES OF SKIN SENSATION: ICD-10-CM

## 2024-11-15 DIAGNOSIS — G62.9 NEUROPATHY: ICD-10-CM

## 2024-11-15 DIAGNOSIS — I73.9 CLAUDICATION OF BOTH LOWER EXTREMITIES (HCC): ICD-10-CM

## 2024-11-15 DIAGNOSIS — Z13.6 SCREENING FOR CARDIOVASCULAR CONDITION: ICD-10-CM

## 2024-11-15 DIAGNOSIS — G25.81 RLS (RESTLESS LEGS SYNDROME): ICD-10-CM

## 2024-11-15 DIAGNOSIS — M25.561 CHRONIC PAIN OF RIGHT KNEE: ICD-10-CM

## 2024-11-15 DIAGNOSIS — G89.29 CHRONIC PAIN OF RIGHT KNEE: ICD-10-CM

## 2024-11-15 LAB
ALBUMIN SERPL BCG-MCNC: 4.5 G/DL (ref 3.5–5)
ALP SERPL-CCNC: 89 U/L (ref 34–104)
ALT SERPL W P-5'-P-CCNC: 13 U/L (ref 7–52)
ANION GAP SERPL CALCULATED.3IONS-SCNC: 9 MMOL/L (ref 4–13)
AST SERPL W P-5'-P-CCNC: 18 U/L (ref 13–39)
BILIRUB SERPL-MCNC: 0.67 MG/DL (ref 0.2–1)
BUN SERPL-MCNC: 22 MG/DL (ref 5–25)
CALCIUM SERPL-MCNC: 9.3 MG/DL (ref 8.4–10.2)
CHLORIDE SERPL-SCNC: 106 MMOL/L (ref 96–108)
CHOLEST SERPL-MCNC: 185 MG/DL (ref ?–200)
CO2 SERPL-SCNC: 27 MMOL/L (ref 21–32)
CREAT SERPL-MCNC: 0.5 MG/DL (ref 0.6–1.3)
FERRITIN SERPL-MCNC: 67 NG/ML (ref 11–307)
GFR SERPL CREATININE-BSD FRML MDRD: 98 ML/MIN/1.73SQ M
GLUCOSE P FAST SERPL-MCNC: 110 MG/DL (ref 65–99)
HDLC SERPL-MCNC: 88 MG/DL
IRON SATN MFR SERPL: 40 % (ref 15–50)
IRON SERPL-MCNC: 139 UG/DL (ref 50–212)
LDLC SERPL CALC-MCNC: 82 MG/DL (ref 0–100)
MAGNESIUM SERPL-MCNC: 2 MG/DL (ref 1.9–2.7)
NONHDLC SERPL-MCNC: 97 MG/DL
POTASSIUM SERPL-SCNC: 4.1 MMOL/L (ref 3.5–5.3)
PROT SERPL-MCNC: 7.3 G/DL (ref 6.4–8.4)
SODIUM SERPL-SCNC: 142 MMOL/L (ref 135–147)
TIBC SERPL-MCNC: 346 UG/DL (ref 250–450)
TRIGL SERPL-MCNC: 76 MG/DL (ref ?–150)
UIBC SERPL-MCNC: 207 UG/DL (ref 155–355)
VIT B12 SERPL-MCNC: 530 PG/ML (ref 180–914)

## 2024-11-15 PROCEDURE — 82607 VITAMIN B-12: CPT

## 2024-11-15 PROCEDURE — 93925 LOWER EXTREMITY STUDY: CPT | Performed by: SURGERY

## 2024-11-15 PROCEDURE — 93925 LOWER EXTREMITY STUDY: CPT

## 2024-11-15 PROCEDURE — 83735 ASSAY OF MAGNESIUM: CPT

## 2024-11-15 PROCEDURE — 36415 COLL VENOUS BLD VENIPUNCTURE: CPT

## 2024-11-15 PROCEDURE — 83540 ASSAY OF IRON: CPT

## 2024-11-15 PROCEDURE — 80053 COMPREHEN METABOLIC PANEL: CPT

## 2024-11-15 PROCEDURE — 83550 IRON BINDING TEST: CPT

## 2024-11-15 PROCEDURE — 80061 LIPID PANEL: CPT

## 2024-11-15 PROCEDURE — 93922 UPR/L XTREMITY ART 2 LEVELS: CPT | Performed by: SURGERY

## 2024-11-15 PROCEDURE — 93923 UPR/LXTR ART STDY 3+ LVLS: CPT

## 2024-11-15 PROCEDURE — 82728 ASSAY OF FERRITIN: CPT

## 2024-11-19 ENCOUNTER — RESULTS FOLLOW-UP (OUTPATIENT)
Dept: FAMILY MEDICINE CLINIC | Facility: CLINIC | Age: 69
End: 2024-11-19

## 2024-11-29 NOTE — TELEPHONE ENCOUNTER
Pt returned call, message in chart relayed.  Patient is calling to get x-ray of back orders that provider mentioned she get in her message about blood work results.  Patient is doing PT for hip and knee pain, she needs an extension to that therapy, medicare will only pay for 30 days at a time.  She is also asking for something for pain, gabapentin did not help at all.

## 2024-12-03 ENCOUNTER — APPOINTMENT (OUTPATIENT)
Dept: RADIOLOGY | Facility: CLINIC | Age: 69
End: 2024-12-03
Payer: MEDICARE

## 2024-12-03 DIAGNOSIS — I73.9 CLAUDICATION OF BOTH LOWER EXTREMITIES (HCC): ICD-10-CM

## 2024-12-03 PROCEDURE — 72110 X-RAY EXAM L-2 SPINE 4/>VWS: CPT

## 2024-12-11 ENCOUNTER — OFFICE VISIT (OUTPATIENT)
Dept: FAMILY MEDICINE CLINIC | Facility: CLINIC | Age: 69
End: 2024-12-11
Payer: MEDICARE

## 2024-12-11 VITALS
OXYGEN SATURATION: 99 % | TEMPERATURE: 97.1 F | WEIGHT: 179.6 LBS | SYSTOLIC BLOOD PRESSURE: 121 MMHG | RESPIRATION RATE: 16 BRPM | HEIGHT: 68 IN | HEART RATE: 72 BPM | BODY MASS INDEX: 27.22 KG/M2 | DIASTOLIC BLOOD PRESSURE: 67 MMHG

## 2024-12-11 DIAGNOSIS — I73.9 CLAUDICATION OF BOTH LOWER EXTREMITIES (HCC): ICD-10-CM

## 2024-12-11 DIAGNOSIS — Z12.31 SCREENING MAMMOGRAM FOR BREAST CANCER: ICD-10-CM

## 2024-12-11 DIAGNOSIS — Z00.00 MEDICARE ANNUAL WELLNESS VISIT, SUBSEQUENT: Primary | ICD-10-CM

## 2024-12-11 DIAGNOSIS — Z78.0 POST-MENOPAUSAL: ICD-10-CM

## 2024-12-11 DIAGNOSIS — F32.0 CURRENT MILD EPISODE OF MAJOR DEPRESSIVE DISORDER WITHOUT PRIOR EPISODE (HCC): ICD-10-CM

## 2024-12-11 DIAGNOSIS — G62.9 NEUROPATHY: ICD-10-CM

## 2024-12-11 DIAGNOSIS — M17.11 PRIMARY OSTEOARTHRITIS OF RIGHT KNEE: ICD-10-CM

## 2024-12-11 DIAGNOSIS — M51.361 DEGENERATION OF INTERVERTEBRAL DISC OF LUMBAR REGION WITH LOWER EXTREMITY PAIN: ICD-10-CM

## 2024-12-11 PROCEDURE — G0439 PPPS, SUBSEQ VISIT: HCPCS | Performed by: FAMILY MEDICINE

## 2024-12-11 RX ORDER — MELOXICAM 15 MG/1
15 TABLET ORAL DAILY
Qty: 30 TABLET | Refills: 0 | Status: SHIPPED | OUTPATIENT
Start: 2024-12-11

## 2024-12-11 RX ORDER — GABAPENTIN 300 MG/1
300 CAPSULE ORAL 3 TIMES DAILY
Qty: 90 CAPSULE | Refills: 0 | Status: SHIPPED | OUTPATIENT
Start: 2024-12-11

## 2024-12-11 NOTE — ASSESSMENT & PLAN NOTE
Working with physical therapy  Orders:    meloxicam (Mobic) 15 mg tablet; Take 1 tablet (15 mg total) by mouth daily    Ambulatory referral to Spine & Pain Management; Future

## 2024-12-11 NOTE — PROGRESS NOTES
Name: Guillermina Cleveland      : 1955      MRN: [id number]  Encounter Provider: Endy Padilla MD  Encounter Date: 2024   Encounter department: [facility]      No orders of the defined types were placed in this encounter.       Preventive health issues were discussed with patient, and age appropriate screening tests were ordered as noted in patient's After Visit Summary. Personalized health advice and appropriate referrals for health education or preventive services given if needed, as noted in patient's After Visit Summary.    History of Present Illness         Patient Care Team:  Endy Padilla MD as PCP - General (Family Medicine)    Review of Systems  Annual Wellness Visit Questionnaire   Guillermina is here for her Subsequent Wellness visit. Last Medicare Wellness visit information reviewed, patient interviewed and updates made to the record today.      Health Risk Assessment:   Patient rates overall health as fair. Patient feels that their physical health rating is much worse. Patient is dissatisfied with their life. Eyesight was rated as same. Hearing was rated as same. Patient feels that their emotional and mental health rating is slightly worse. Patients states they are sometimes angry. Patient states they are often unusually tired/fatigued. Pain experienced in the last 7 days has been a lot. Patient's pain rating has been 8/10. Patient states that she has experienced no weight loss or gain in last 6 months. Worse given the leg pain  Can't get out and walk  like she use to  Was walking a mile last year but now she can't    Depression Screening:   PHQ-2 Score: 2      Fall Risk Screening:   In the past year, patient has experienced: no history of falling in past year      Urinary Incontinence Screening:   Patient has not leaked urine accidently in the last six months.     Home Safety:  Patient has trouble with stairs inside or outside of their home. Patient has working smoke  alarms and has working carbon monoxide detector. Home safety hazards include: loose rugs on the floor.     Nutrition:   Current diet is Regular.     Medications:   Patient is not currently taking any over-the-counter supplements. Patient is able to manage medications.     Activities of Daily Living (ADLs)/Instrumental Activities of Daily Living (IADLs):   Walk and transfer into and out of bed and chair?: Yes  Dress and groom yourself?: Yes    Bathe or shower yourself?: Yes    Feed yourself? Yes  Do your laundry/housekeeping?: Yes  Manage your money, pay your bills and track your expenses?: Yes  Make your own meals?: Yes    Do your own shopping?: Yes    Previous Hospitalizations:   Any hospitalizations or ED visits within the last 12 months?: No      Advance Care Planning:   Living will: No    Durable POA for healthcare: No    Advanced directive: No    Advanced directive counseling given: Yes    ACP document given: Yes    Patient declined ACP directive: No      PREVENTIVE SCREENINGS      Cardiovascular Screening:    General: Screening Current      Diabetes Screening:     General: Screening Current      Cervical Cancer Screening:    General: Screening Not Indicated      Lung Cancer Screening:     General: Screening Not Indicated    Screening, Brief Intervention, and Referral to Treatment (SBIRT)    Screening  Typical number of drinks in a day: 1  Typical number of drinks in a week: 5  Interpretation: Low risk drinking behavior.    AUDIT-C Screenin) How often did you have a drink containing alcohol in the past year? 2 to 3 times a week  2) How many drinks did you have on a typical day when you were drinking in the past year? 1 to 2  3) How often did you have 6 or more drinks on one occasion in the past year? never    AUDIT-C Score: 3  Interpretation: Score 3-12 (female): POSITIVE screen for alcohol misuse    AUDIT Screenin) How often during the last year have you found that you were not able to stop drinking  once you had started? 0 - never  5) How often during the last year have you failed to do what was normally expected from you because of drinking? 0 - never  6) How often during the last year have you needed a first drink in the morning to get yourself going after a heavy drinking session? 0 - never  7) How often during the last year have you had a feeling of guilt or remorse after drinking? 0 - never  8) How often during the last year have you been unable to remember what happened the night before because you had been drinking? 0 - never  9) Have you or someone else been injured as a result of your drinking? 0 - no  10) Has a relative or friend or a doctor or another health worker been concerned about your drinking or suggested you cut down? 0 - no    AUDIT Score: 3  Interpretation: Low risk alcohol consumption    Single Item Drug Screening:  How often have you used an illegal drug (including marijuana) or a prescription medication for non-medical reasons in the past year? never    Single Item Drug Screen Score: 0  Interpretation: Negative screen for possible drug use disorder    Social Drivers of Health     Food Insecurity: No Food Insecurity (12/11/2024)    Hunger Vital Sign     Worried About Running Out of Food in the Last Year: Never true     Ran Out of Food in the Last Year: Never true   Transportation Needs: No Transportation Needs (12/11/2024)    PRAPARE - Transportation     Lack of Transportation (Medical): No     Lack of Transportation (Non-Medical): No   Housing Stability: Unknown (12/11/2024)    Housing Stability Vital Sign     Unable to Pay for Housing in the Last Year: No     Homeless in the Last Year: No   Utilities: Not At Risk (12/11/2024)    [facility] Utilities     Threatened with loss of utilities: No     No results found.    Objective   There were no vitals taken for this visit.      Physical Exam

## 2024-12-11 NOTE — ASSESSMENT & PLAN NOTE
As a result of her pain, patient has felt depressed with her current state.  States that she was able to walk a mile a day in the past but is now confined to her home.

## 2024-12-11 NOTE — PROGRESS NOTES
Name: Guillermina Cleveland      : 1955      MRN: [id number]  Encounter Provider: Endy Padilla MD  Encounter Date: 2024   Encounter department: [facility]      Assessment & Plan  Medicare annual wellness visit, subsequent  Due for mammogram and DEXA scan.  Both ordered.  Reviewed recent labs.  Return to clinic in 4 months.       Claudication of both lower extremities (HCC)  Suspect neuro claudication from lumbar stenosis.  Currently working with physical therapy and has seen her chiropractor.  X-ray showed degenerative disc disease from L1-L3.  Continue Mobic 15 mg daily and increase gabapentin to 300 mg 3 times daily.  Referred to pain management for possible intervention.  Orders:    meloxicam (Mobic) 15 mg tablet; Take 1 tablet (15 mg total) by mouth daily    Primary osteoarthritis of right knee  Working with physical therapy  Orders:    meloxicam (Mobic) 15 mg tablet; Take 1 tablet (15 mg total) by mouth daily    Ambulatory referral to Spine & Pain Management; Future    Current mild episode of major depressive disorder without prior episode (HCC)  As a result of her pain, patient has felt depressed with her current state.  States that she was able to walk a mile a day in the past but is now confined to her home.       Neuropathy  Increase gabapentin to 300 mg 3 times daily.  100 mg was previously ordered and had increased it to 300 with very little relief  Orders:    gabapentin (Neurontin) 300 mg capsule; Take 1 capsule (300 mg total) by mouth 3 (three) times a day    Ambulatory referral to Spine & Pain Management; Future    Screening mammogram for breast cancer  Due for breast cancer screening  Orders:    Mammo screening bilateral w 3d and cad; Future    Post-menopausal  DEXA ordered  Orders:    DXA bone density spine hip and pelvis; Future    Degeneration of intervertebral disc of lumbar region with lower extremity pain    Orders:    Ambulatory referral to Spine & Pain  Management; Future         Preventive health issues were discussed with patient, and age appropriate screening tests were ordered as noted in patient's After Visit Summary. Personalized health advice and appropriate referrals for health education or preventive services given if needed, as noted in patient's After Visit Summary.    History of Present Illness     Continues to experience right leg pain.  Working with physical therapist to help with right knee and lower back pain. Also saw the chiropractor and had short term relief.        Patient Care Team:  Endy Padilla MD as PCP - General (Family Medicine)    Review of Systems  Medical History Reviewed by provider this encounter:       Annual Wellness Visit Questionnaire       Health Risk Assessment:   Patient rates overall health as fair. Patient feels that their physical health rating is much worse. Patient is dissatisfied with their life. Eyesight was rated as same. Hearing was rated as same. Patient feels that their emotional and mental health rating is slightly worse. Patients states they are sometimes angry. Patient states they are often unusually tired/fatigued. Pain experienced in the last 7 days has been a lot. Patient's pain rating has been 8/10. Patient states that she has experienced no weight loss or gain in last 6 months.     Depression Screening:   PHQ-2 Score: 2      Fall Risk Screening:   In the past year, patient has experienced: no history of falling in past year      Urinary Incontinence Screening:   Patient has not leaked urine accidently in the last six months.     Home Safety:  Patient has trouble with stairs inside or outside of their home. Patient has working smoke alarms and has working carbon monoxide detector. Home safety hazards include: loose rugs on the floor.     Nutrition:   Current diet is Regular.     Medications:   Patient is not currently taking any over-the-counter supplements. Patient is able to manage medications.      Activities of Daily Living (ADLs)/Instrumental Activities of Daily Living (IADLs):   Walk and transfer into and out of bed and chair?: Yes  Dress and groom yourself?: Yes    Bathe or shower yourself?: Yes    Feed yourself? Yes  Do your laundry/housekeeping?: Yes  Manage your money, pay your bills and track your expenses?: Yes  Make your own meals?: Yes    Do your own shopping?: Yes    Previous Hospitalizations:   Any hospitalizations or ED visits within the last 12 months?: No      Advance Care Planning:   Living will: No    Durable POA for healthcare: No    Advanced directive: No      Screening, Brief Intervention, and Referral to Treatment (SBIRT)    Screening  Typical number of drinks in a day: 1  Typical number of drinks in a week: 5  Interpretation: Low risk drinking behavior.    AUDIT-C Screenin) How often did you have a drink containing alcohol in the past year? 2 to 3 times a week  2) How many drinks did you have on a typical day when you were drinking in the past year? 1 to 2  3) How often did you have 6 or more drinks on one occasion in the past year? never    AUDIT-C Score: 3  Interpretation: Score 3-12 (female): POSITIVE screen for alcohol misuse    AUDIT Screenin) How often during the last year have you found that you were not able to stop drinking once you had started? 0 - never  5) How often during the last year have you failed to do what was normally expected from you because of drinking? 0 - never  6) How often during the last year have you needed a first drink in the morning to get yourself going after a heavy drinking session? 0 - never  7) How often during the last year have you had a feeling of guilt or remorse after drinking? 0 - never  8) How often during the last year have you been unable to remember what happened the night before because you had been drinking? 0 - never  9) Have you or someone else been injured as a result of your drinking? 0 - no  10) Has a relative or friend  "or a doctor or another health worker been concerned about your drinking or suggested you cut down? 0 - no    AUDIT Score: 3  Interpretation: Low risk alcohol consumption    Single Item Drug Screening:  How often have you used an illegal drug (including marijuana) or a prescription medication for non-medical reasons in the past year? never    Single Item Drug Screen Score: 0  Interpretation: Negative screen for possible drug use disorder    Social Drivers of Health     Food Insecurity: No Food Insecurity (12/11/2024)    Hunger Vital Sign     Worried About Running Out of Food in the Last Year: Never true     Ran Out of Food in the Last Year: Never true   Transportation Needs: No Transportation Needs (12/11/2024)    PRAPARE - Transportation     Lack of Transportation (Medical): No     Lack of Transportation (Non-Medical): No   Housing Stability: Low Risk  (12/11/2024)    Housing Stability Vital Sign     Unable to Pay for Housing in the Last Year: No     Number of Times Moved in the Last Year: 0     Homeless in the Last Year: No   Utilities: Not At Risk (12/11/2024)    [facility] Utilities     Threatened with loss of utilities: No     No results found.    Objective   /67 (BP Location: Left arm, Patient Position: Sitting, Cuff Size: Large)   Pulse 72   Temp (!) 97.1 °F (36.2 °C) (Tympanic)   Resp 16   Ht 5' 8\" (1.727 m)   Wt 81.5 kg (179 lb 9.6 oz)   SpO2 99%   BMI 27.31 kg/m²     Physical Exam  Constitutional:       General: She is not in acute distress.     Appearance: Normal appearance. She is not ill-appearing or toxic-appearing.   HENT:      Head: Normocephalic and atraumatic.   Eyes:      Extraocular Movements: Extraocular movements intact.   Pulmonary:      Effort: Pulmonary effort is normal.   Skin:     General: Skin is warm.   Neurological:      Mental Status: She is alert and oriented to person, place, and time.      Gait: Gait abnormal.   Psychiatric:         Mood and Affect: Mood normal.         " Behavior: Behavior normal.         Thought Content: Thought content normal.

## 2024-12-11 NOTE — PATIENT INSTRUCTIONS
Medicare Preventive Visit Patient Instructions  Thank you for completing your Welcome to Medicare Visit or Medicare Annual Wellness Visit today. Your next wellness visit will be due in one year (12/12/2025).  The screening/preventive services that you may require over the next 5-10 years are detailed below. Some tests may not apply to you based off risk factors and/or age. Screening tests ordered at today's visit but not completed yet may show as past due. Also, please note that scanned in results may not display below.  Preventive Screenings:  Service Recommendations Previous Testing/Comments   Colorectal Cancer Screening  * Colonoscopy    * Fecal Occult Blood Test (FOBT)/Fecal Immunochemical Test (FIT)  * Fecal DNA/Cologuard Test  * Flexible Sigmoidoscopy Age: 45-75 years old   Colonoscopy: every 10 years (may be performed more frequently if at higher risk)  OR  FOBT/FIT: every 1 year  OR  Cologuard: every 3 years  OR  Sigmoidoscopy: every 5 years  Screening may be recommended earlier than age 45 if at higher risk for colorectal cancer. Also, an individualized decision between you and your healthcare provider will decide whether screening between the ages of 76-85 would be appropriate. Colonoscopy: Not on file  FOBT/FIT: Not on file  Cologuard: Not on file  Sigmoidoscopy: Not on file          Breast Cancer Screening Age: 40+ years old  Frequency: every 1-2 years  Not required if history of left and right mastectomy Mammogram: Not on file        Cervical Cancer Screening Between the ages of 21-29, pap smear recommended once every 3 years.   Between the ages of 30-65, can perform pap smear with HPV co-testing every 5 years.   Recommendations may differ for women with a history of total hysterectomy, cervical cancer, or abnormal pap smears in past. Pap Smear: Not on file    Screening Not Indicated   Hepatitis C Screening Once for adults born between 1945 and 1965  More frequently in patients at high risk for  Hepatitis C Hep C Antibody: Not on file        Diabetes Screening 1-2 times per year if you're at risk for diabetes or have pre-diabetes Fasting glucose: 110 mg/dL (11/15/2024)  A1C: No results in last 5 years (No results in last 5 years)  Screening Current   Cholesterol Screening Once every 5 years if you don't have a lipid disorder. May order more often based on risk factors. Lipid panel: 11/15/2024    Screening Current     Other Preventive Screenings Covered by Medicare:  Abdominal Aortic Aneurysm (AAA) Screening: covered once if your at risk. You're considered to be at risk if you have a family history of AAA.  Lung Cancer Screening: covers low dose CT scan once per year if you meet all of the following conditions: (1) Age 55-77; (2) No signs or symptoms of lung cancer; (3) Current smoker or have quit smoking within the last 15 years; (4) You have a tobacco smoking history of at least 20 pack years (packs per day multiplied by number of years you smoked); (5) You get a written order from a healthcare provider.  Glaucoma Screening: covered annually if you're considered high risk: (1) You have diabetes OR (2) Family history of glaucoma OR (3)  aged 50 and older OR (4)  American aged 65 and older  Osteoporosis Screening: covered every 2 years if you meet one of the following conditions: (1) You're estrogen deficient and at risk for osteoporosis based off medical history and other findings; (2) Have a vertebral abnormality; (3) On glucocorticoid therapy for more than 3 months; (4) Have primary hyperparathyroidism; (5) On osteoporosis medications and need to assess response to drug therapy.   Last bone density test (DXA Scan): Not on file.  HIV Screening: covered annually if you're between the age of 15-65. Also covered annually if you are younger than 15 and older than 65 with risk factors for HIV infection. For pregnant patients, it is covered up to 3 times per  pregnancy.    Immunizations:  Immunization Recommendations   Influenza Vaccine Annual influenza vaccination during flu season is recommended for all persons aged >= 6 months who do not have contraindications   Pneumococcal Vaccine   * Pneumococcal conjugate vaccine = PCV13 (Prevnar 13), PCV15 (Vaxneuvance), PCV20 (Prevnar 20)  * Pneumococcal polysaccharide vaccine = PPSV23 (Pneumovax) Adults 19-65 yo with certain risk factors or if 65+ yo  If never received any pneumonia vaccine: recommend Prevnar 20 (PCV20)  Give PCV20 if previously received 1 dose of PCV13 or PPSV23   Hepatitis B Vaccine 3 dose series if at intermediate or high risk (ex: diabetes, end stage renal disease, liver disease)   Respiratory syncytial virus (RSV) Vaccine - COVERED BY MEDICARE PART D  * RSVPreF3 (Arexvy) CDC recommends that adults 60 years of age and older may receive a single dose of RSV vaccine using shared clinical decision-making (SCDM)   Tetanus (Td) Vaccine - COST NOT COVERED BY MEDICARE PART B Following completion of primary series, a booster dose should be given every 10 years to maintain immunity against tetanus. Td may also be given as tetanus wound prophylaxis.   Tdap Vaccine - COST NOT COVERED BY MEDICARE PART B Recommended at least once for all adults. For pregnant patients, recommended with each pregnancy.   Shingles Vaccine (Shingrix) - COST NOT COVERED BY MEDICARE PART B  2 shot series recommended in those 19 years and older who have or will have weakened immune systems or those 50 years and older     Health Maintenance Due:      Topic Date Due   • Hepatitis C Screening  Never done   • Breast Cancer Screening: Mammogram  Never done   • Colorectal Cancer Screening  Never done     Immunizations Due:      Topic Date Due   • Pneumococcal Vaccine: 65+ Years (1 of 1 - PCV) Never done   • Influenza Vaccine (1) Never done   • COVID-19 Vaccine (1 - 2024-25 season) Never done     Advance Directives   What are advance directives?   Advance directives are legal documents that state your wishes and plans for medical care. These plans are made ahead of time in case you lose your ability to make decisions for yourself. Advance directives can apply to any medical decision, such as the treatments you want, and if you want to donate organs.   What are the types of advance directives?  There are many types of advance directives, and each state has rules about how to use them. You may choose a combination of any of the following:  Living will:  This is a written record of the treatment you want. You can also choose which treatments you do not want, which to limit, and which to stop at a certain time. This includes surgery, medicine, IV fluid, and tube feedings.   Durable power of  for healthcare (DPAHC):  This is a written record that states who you want to make healthcare choices for you when you are unable to make them for yourself. This person, called a proxy, is usually a family member or a friend. You may choose more than 1 proxy.  Do not resuscitate (DNR) order:  A DNR order is used in case your heart stops beating or you stop breathing. It is a request not to have certain forms of treatment, such as CPR. A DNR order may be included in other types of advance directives.  Medical directive:  This covers the care that you want if you are in a coma, near death, or unable to make decisions for yourself. You can list the treatments you want for each condition. Treatment may include pain medicine, surgery, blood transfusions, dialysis, IV or tube feedings, and a ventilator (breathing machine).  Values history:  This document has questions about your views, beliefs, and how you feel and think about life. This information can help others choose the care that you would choose.  Why are advance directives important?  An advance directive helps you control your care. Although spoken wishes may be used, it is better to have your wishes written down.  Spoken wishes can be misunderstood, or not followed. Treatments may be given even if you do not want them. An advance directive may make it easier for your family to make difficult choices about your care.   Weight Management   Why it is important to manage your weight:  Being overweight increases your risk of health conditions such as heart disease, high blood pressure, type 2 diabetes, and certain types of cancer. It can also increase your risk for osteoarthritis, sleep apnea, and other respiratory problems. Aim for a slow, steady weight loss. Even a small amount of weight loss can lower your risk of health problems.  How to lose weight safely:  A safe and healthy way to lose weight is to eat fewer calories and get regular exercise. You can lose up about 1 pound a week by decreasing the number of calories you eat by 500 calories each day.   Healthy meal plan for weight management:  A healthy meal plan includes a variety of foods, contains fewer calories, and helps you stay healthy. A healthy meal plan includes the following:  Eat whole-grain foods more often.  A healthy meal plan should contain fiber. Fiber is the part of grains, fruits, and vegetables that is not broken down by your body. Whole-grain foods are healthy and provide extra fiber in your diet. Some examples of whole-grain foods are whole-wheat breads and pastas, oatmeal, brown rice, and bulgur.  Eat a variety of vegetables every day.  Include dark, leafy greens such as spinach, kale, kulwant greens, and mustard greens. Eat yellow and orange vegetables such as carrots, sweet potatoes, and winter squash.   Eat a variety of fruits every day.  Choose fresh or canned fruit (canned in its own juice or light syrup) instead of juice. Fruit juice has very little or no fiber.  Eat low-fat dairy foods.  Drink fat-free (skim) milk or 1% milk. Eat fat-free yogurt and low-fat cottage cheese. Try low-fat cheeses such as mozzarella and other reduced-fat  "cheeses.  Choose meat and other protein foods that are low in fat.  Choose beans or other legumes such as split peas or lentils. Choose fish, skinless poultry (chicken or turkey), or lean cuts of red meat (beef or pork). Before you cook meat or poultry, cut off any visible fat.   Use less fat and oil.  Try baking foods instead of frying them. Add less fat, such as margarine, sour cream, regular salad dressing and mayonnaise to foods. Eat fewer high-fat foods. Some examples of high-fat foods include french fries, doughnuts, ice cream, and cakes.  Eat fewer sweets.  Limit foods and drinks that are high in sugar. This includes candy, cookies, regular soda, and sweetened drinks.  Exercise:  Exercise at least 30 minutes per day on most days of the week. Some examples of exercise include walking, biking, dancing, and swimming. You can also fit in more physical activity by taking the stairs instead of the elevator or parking farther away from stores. Ask your healthcare provider about the best exercise plan for you.   Alcohol Use and Your Health    Drinking too much can harm your health.  Excessive alcohol use leads to about 88,000 death in the [place] each year, and shortens the life of those who diet by almost 30 years.  Further, excessive drinking cost the economy $249 billion in 2010.  Most excessive drinkers are not alcohol dependent.    Excessive alcohol use has immediate effects that increase the risk of many harmful health conditions.  These are most often the result of binge drinking.  Over time, excessive alcohol use can lead to the development of chronic diseases and other series health problems.    What is considered a \"drink\"?        Excessive alcohol use includes:  Binge Drinking: For women, 4 or more drinks consumed on one occasion. For men, 5 or more drinks consumed on one occasion.  Heavy Drinking: For women, 8 or more drinks per week. For men, 15 or more drinks per week  Any alcohol used by pregnant " women  Any alcohol used by those under the age of 21 years    If you choose to drink, do so in moderation:  Do not drink at all if you are under the age of 21, or if you are or may be pregnant, or have health problems that could be made worse by drinking.  For women, up to 1 drink per day  For men, up to 2 drinks a day    No one should begin drinking or drink more frequently based on potential health benefits    Short-Term Health Risks:  Injuries: motor vehicle crashes, falls, drownings, burns  Violence: homicide, suicide, sexual assault, intimate partner violence  Alcohol poisoning  Reproductive health: risky sexual behaviors, unintended prengnacy, sexually transmitted diseases, miscarriage, stillbirth, fetal alcohol syndrome    Long-Term Health Risks:  Chronic diseases: high blood pressure, heart disease, stroke, liver disease, digestive problems  Cancers: breast, mouth and throat, liver, colon  Learning and memory problems: dementia, poor school performance  Mental health: depression, anxiety, insomnia  Social problems: lost productivity, family problems, unemployment  Alcohol dependence    For support and more information:  Substance Abuse and Mental Health Services Administration  [address]  [place] [address]  Web Address: http://www.[facility] Hospitalhsa.gov    Alcoholics Anonymous        Web Address: http://www.aa.org    https://www.cdc.gov/alcohol/fact-sheets/alcohol-use.htm     © Copyright [employer] 2018 Information is for End User's use only and may not be sold, redistributed or otherwise used for commercial purposes. All illustrations and images included in CareNotes® are the copyrighted property of [employer].A[employer]M., Inc. or [employer]

## 2025-01-12 ENCOUNTER — PATIENT MESSAGE (OUTPATIENT)
Dept: FAMILY MEDICINE CLINIC | Facility: CLINIC | Age: 70
End: 2025-01-12

## 2025-01-12 DIAGNOSIS — M51.361 DEGENERATION OF INTERVERTEBRAL DISC OF LUMBAR REGION WITH LOWER EXTREMITY PAIN: Primary | ICD-10-CM

## 2025-01-12 DIAGNOSIS — I73.9 CLAUDICATION OF BOTH LOWER EXTREMITIES (HCC): ICD-10-CM

## 2025-01-12 DIAGNOSIS — G62.9 NEUROPATHY: ICD-10-CM

## 2025-01-13 ENCOUNTER — TELEPHONE (OUTPATIENT)
Age: 70
End: 2025-01-13

## 2025-01-13 DIAGNOSIS — I73.9 CLAUDICATION OF BOTH LOWER EXTREMITIES (HCC): Primary | ICD-10-CM

## 2025-01-13 DIAGNOSIS — M51.361 DEGENERATION OF INTERVERTEBRAL DISC OF LUMBAR REGION WITH LOWER EXTREMITY PAIN: ICD-10-CM

## 2025-01-13 NOTE — TELEPHONE ENCOUNTER
Keenan from [facility] called for an updated script for Guillermina to be faxed over #[phone]

## 2025-01-13 NOTE — TELEPHONE ENCOUNTER
Patient calling in regarding f/u from [facility] 12/11/24. States her apt with spine and pain management is not until 2/3/25. States she has been taking meloxicam (Mobic) 15 mg tablet and gabapentin (Neurontin) 300 mg capsule but they have not been very effective. She is wondering if PCP would be willing to send in prescription  for something else to help with the pain. States at [facility] PCP mentioned a possible steroid. Reports today it is a 6/10 and yesterday it was an 8/10 and patient could barely walk.  Has been participating in PT. Please reach out to the patient with provider response.

## 2025-01-14 RX ORDER — GABAPENTIN 300 MG/1
CAPSULE ORAL
Qty: 120 CAPSULE | Refills: 0 | Status: SHIPPED | OUTPATIENT
Start: 2025-01-14

## 2025-01-14 RX ORDER — METHYLPREDNISOLONE 4 MG/1
TABLET ORAL
Qty: 21 EACH | Refills: 0 | Status: SHIPPED | OUTPATIENT
Start: 2025-01-14

## 2025-01-14 NOTE — TELEPHONE ENCOUNTER
Patient called to follow up on PT script, advised patient was faxed. Also inquired about her pain medication, advised steroid sent to pharmacy along with increase of Gabapentin as per Dr Padilla

## 2025-01-17 ENCOUNTER — OFFICE VISIT (OUTPATIENT)
Dept: URGENT CARE | Facility: CLINIC | Age: 70
End: 2025-01-17
Payer: MEDICARE

## 2025-01-17 VITALS
OXYGEN SATURATION: 99 % | TEMPERATURE: 97.2 F | DIASTOLIC BLOOD PRESSURE: 71 MMHG | HEIGHT: 68 IN | RESPIRATION RATE: 18 BRPM | SYSTOLIC BLOOD PRESSURE: 130 MMHG | HEART RATE: 83 BPM | BODY MASS INDEX: 27.13 KG/M2 | WEIGHT: 179 LBS

## 2025-01-17 DIAGNOSIS — J06.9 UPPER RESPIRATORY TRACT INFECTION, UNSPECIFIED TYPE: Primary | ICD-10-CM

## 2025-01-17 PROCEDURE — G0463 HOSPITAL OUTPT CLINIC VISIT: HCPCS | Performed by: NURSE PRACTITIONER

## 2025-01-17 PROCEDURE — 99213 OFFICE O/P EST LOW 20 MIN: CPT | Performed by: NURSE PRACTITIONER

## 2025-01-17 NOTE — LETTER
January 17, 2025     Patient: Guillermina Cleveland   YOB: 1955   Date of Visit: 1/17/2025       To Whom it May Concern:    Guillermina Cleveland was seen in my clinic on 1/17/2025. She may return to work on 1/19/2025 .    If you have any questions or concerns, please don't hesitate to call.         Sincerely,          BE JOSÉ MIGUEL ARAGON        CC: No Recipients

## 2025-01-17 NOTE — PROGRESS NOTES
[facility]        NAME: Guillermina Cleveland is a 69 y.o. female  : 1955    MRN: [id number]  DATE: 2025  TIME: 2:12 PM    Assessment and Plan   Upper respiratory tract infection, unspecified type [J06.9]  1. Upper respiratory tract infection, unspecified type            Medical Decision Making     PROBLEM: 1 acute, uncomplicated illness or injury    DATA: Note(s) Reviewed: no  Lab(s) Reviewed: no    Test(s) Ordered: no  Independent Historian Involved: no  Imaging independently reviewed today: no  Case was discussed with another provider: no    RISK: Over-the-counter medication(s)      Patient Instructions     Mucinex as directed to thin secretions  Flonase 1 spray each nostril daily.  Saline nasal spray as directed to rinse sinus passages.  Increase your fluid intake.  Tylenol and/or Motrin as needed for pain or fever.  Follow up with your PCP for worsening or concerning symptoms  Follow up with PCP in 3-5 days.  Proceed to  ER if symptoms worsen.    Chief Complaint     Chief Complaint   Patient presents with    Sinusitis     Pt reports of sinus pain and pressure with cough and congestion.          History of Present Illness       Patient is a 69 year old female presenting today with 1 day of headache, congestion and cough. Denies fever, chills, nausea or vomiting.  She has been taking pseudoephedrine and nasal rinses without relief. Denies sick contacts. Of note, patient mentioned at end of visit twisting her left knee yesterday while cleaning off her car. Patient has been treated for knee pain with her PCP and has prescription for a steroid pack which she has not yet started.         Review of Systems   Review of Systems   Constitutional:  Negative for chills and fever.   HENT:  Positive for congestion, postnasal drip and sinus pressure. Negative for ear pain and sore throat.    Respiratory:  Negative for chest tightness, shortness of breath and wheezing.    Cardiovascular:  Negative for  "chest pain.   Gastrointestinal:  Negative for diarrhea, nausea and vomiting.   Musculoskeletal:  Positive for arthralgias.         Current Medications       Current Outpatient Medications:     fluticasone (FLONASE) 50 mcg/act nasal spray, 2 sprays into each nostril daily, Disp: , Rfl:     gabapentin (Neurontin) 300 mg capsule, 300 mg in am, 300 mg in pm, and 600 mg qhs, Disp: 120 capsule, Rfl: 0    methylPREDNISolone 4 MG tablet therapy pack, Use as directed on package, Disp: 21 each, Rfl: 0    mometasone (NASONEX) 50 mcg/act nasal spray, 2 sprays into each nostril daily, Disp: , Rfl:     timolol (TIMOPTIC) 0.5 % ophthalmic solution, , Disp: , Rfl:     Current Allergies     Allergies as of 01/17/2025 - Reviewed 01/17/2025   Allergen Reaction Noted    Sulfa antibiotics  07/16/2014            The following portions of the patient's history were reviewed and updated as appropriate: allergies, current medications, past family history, past medical history, past social history, past surgical history and problem list.     History reviewed. No pertinent past medical history.    Past Surgical History:   Procedure Laterality Date    KNEE SURGERY Left     SHOULDER SURGERY Right        History reviewed. No pertinent family history.      Medications have been verified.        Objective   /71   Pulse 83   Temp (!) 97.2 °F (36.2 °C)   Resp 18   Ht 5' 8\" (1.727 m)   Wt 81.2 kg (179 lb)   SpO2 99%   BMI 27.22 kg/m²        Physical Exam     Physical Exam  Vitals reviewed.   Constitutional:       General: She is awake.   HENT:      Head: Normocephalic.      Right Ear: Tympanic membrane, ear canal and external ear normal.      Left Ear: Tympanic membrane, ear canal and external ear normal.      Nose: Nose normal.      Mouth/Throat:      Pharynx: Oropharynx is clear. Posterior oropharyngeal erythema present.   Cardiovascular:      Rate and Rhythm: Normal rate and regular rhythm.      Heart sounds: Normal heart sounds, S1 " normal and S2 normal.   Pulmonary:      Effort: Pulmonary effort is normal.      Breath sounds: Normal breath sounds. No wheezing, rhonchi or rales.   Neurological:      General: No focal deficit present.      Mental Status: She is alert and oriented to person, place, and time.   Psychiatric:         Behavior: Behavior is cooperative.

## 2025-01-17 NOTE — PATIENT INSTRUCTIONS
"Mucinex as directed to thin secretions  Flonase 1 spray each nostril daily.  Saline nasal spray as directed to rinse sinus passages.  Increase your fluid intake.  Tylenol and/or Motrin as needed for pain or fever.  Follow up with your PCP for worsening or concerning symptoms    Patient Education     Cough, runny nose, and the common cold   The Basics   Written by the doctors and editors at [facility]   What causes cough, runny nose, and other symptoms of the common cold? -- These symptoms are usually caused by a virus. Doctors also use the term \"viral upper respiratory infection\" or \"viral URI.\" Lots of different viruses can get into your nose, mouth, throat, or airways and cause cold symptoms.  Most people get better from a cold without any lasting problems. Even so, having a cold can be uncomfortable.  What are the symptoms of the common cold? -- Symptoms can include:   Sneezing   Coughing   Sniffling and runny nose   Sore throat   Chest congestion  In children, the common cold can also cause a fever. But adults do not usually get a fever when they have a cold.  Colds usually last about 3 to 7 days in adults and 10 days in children. But some people have symptoms for up to 2 weeks.  How can I tell if I have a cold or something else? -- Sometimes, it can be hard to tell if you have a cold or something else. Some cold symptoms can also be caused by other illnesses, such as COVID-19, the flu, or strep throat.  There are sometimes clues that can help you tell the difference:   COVID-19 often starts out very similar to a cold, although it can also cause a fever. If you have cold symptoms and have been around someone with COVID-19, you should get a test to find out if you have it, too.   The flu is more likely to cause fever, body aches, and extreme tiredness than a cold.   Strep throat usually causes severe throat pain. It can also cause a fever and swollen glands in the neck. People with strep throat usually do not have " other cold symptoms like a stuffy nose or cough.  If you think that you might have an illness other than the common cold, call your doctor or nurse. They can tell you what to do.  Can medicine help with a cold? -- Usually, a cold gets better on its own and does not need treatment. Because colds are usually caused by viruses, antibiotics will not help.  If you are a teen or an adult, you can try cough and cold medicines that you can get without a prescription. These medicines might help with your symptoms. But they can't cure your cold, or help you get well faster.  If you decide to try non-prescription cold medicines:   Read the directions on the label, and follow them carefully.   Do not combine 2 or more medicines that have acetaminophen in them. If you take too much acetaminophen, it can damage your liver.   If you have a heart condition, have high blood pressure, or take any prescription medicines, talk to your doctor or pharmacist before taking cold medicine. They can tell you which medicines are safe.  Some medicines are not safe for children:   If your child is younger than 6, do not give them any cold medicines. These medicines are not safe for young children. Even if your child is older than 6, cough and cold medicines are unlikely to help.   Never give aspirin to any child younger than 18 years old. In children, aspirin can cause a life-threatening condition called Reye syndrome.   When giving your child acetaminophen or other non-prescription medicines, never give more than the recommended dose.  Is there anything I can do on my own to feel better? -- Yes. You can:   Get plenty of rest.   Drink lots of fluids (water, juice, or broth) to stay hydrated. This will help replace any fluids lost if you have a runny nose or sweating from a fever. Warm tea or soup can help soothe a sore throat.   If the air in your home feels dry, use a cool-mist humidifier. This can help a stuffy nose and make it easier to  breathe.   Use saline nose drops or spray to relieve stuffiness.   Avoid smoking, and stay away from places where people are smoking.  Can the common cold lead to more serious problems? -- In some cases, yes. In some people, having a cold can lead to:   Ear infections   Worse asthma symptoms   Sinus infections   Pneumonia or bronchitis (infections of the lungs)  Can colds be prevented? -- There are some things you can do to keep germs from spreading:   Wash your hands with soap and water often (figure 1) - This can also help prevent the spread of other illnesses like the flu and COVID-19.   Cover your cough - Cough into your elbow instead of your hands. Teach children to do this, too. Throw away used tissues right away.   Clean surfaces - The germs that cause the common cold can live on tables, door handles, and other surfaces for at least 2 hours.   Stay home if you are sick - When you do need to be around other people, consider wearing a face mask until you are feeling better.  When should I call the doctor? -- Contact your doctor or nurse if you:   Lose your sense of taste or smell   Have a fever of more than 100.4°F (38°C) that comes with shaking chills, loss of appetite, or trouble breathing   Have a very bad sore throat   Have a fever and also have lung disease, such as emphysema or asthma   Have a cough that lasts longer than 10 days or starts getting worse   Have chest pain when you cough or breathe deeply, have trouble breathing, or cough up blood  If you are older than 65, or if you have any chronic medical conditions such as diabetes, contact your doctor or nurse any time you get a long-lasting cough.  Take your child to the emergency department if they:   Become confused or stop responding to you   Have trouble breathing or have to work hard to breathe  Contact your child's doctor or nurse if the child:   Loses their sense of taste or smell or won't eat foods that they ate before   Has a very bad sore  throat   Refuses to drink anything for a long time   Is younger than 4 months   Has a fever and is not acting like themselves   Has a cough that lasts for more than 2 weeks and is not getting any better or is getting worse   Has a stuffed or runny nose that gets worse or does not get any better after 10 days   Has red eyes or yellow goop coming out of their eyes   Has ear pain, pulls at their ears, or shows other signs of having an ear infection  All topics are updated as new evidence becomes available and our peer review process is complete.  This topic retrieved from [employer] on: Feb 26, 2024.  Topic 94641 Version 30.0  Release: 32.2.4 - C32.56  © 2024 UpToDate, Inc. and/or its affiliates. All rights reserved.  figure 1: How to wash your hands     Wet your hands with clean water, and apply a small amount of soap. Lather and rub hands together for at least 20 seconds. Clean your wrists, palms, backs of your hands, between your fingers, tips of your fingers, thumbs, and under and around your nails. Rinse well, and dry your hands using a clean towel.  Graphic 873329 Version 7.0  Consumer Information Use and Disclaimer   Disclaimer: This generalized information is a limited summary of diagnosis, treatment, and/or medication information. It is not meant to be comprehensive and should be used as a tool to help the user understand and/or assess potential diagnostic and treatment options. It does NOT include all information about conditions, treatments, medications, side effects, or risks that may apply to a specific patient. It is not intended to be medical advice or a substitute for the medical advice, diagnosis, or treatment of a health care provider based on the health care provider's examination and assessment of a patient's specific and unique circumstances. Patients must speak with a health care provider for complete information about their health, medical questions, and treatment options, including any risks or  benefits regarding use of medications. This information does not endorse any treatments or medications as safe, effective, or approved for treating a specific patient. UpToDate, Inc. and its affiliates disclaim any warranty or liability relating to this information or the use thereof.The use of this information is governed by the Terms of Use, available at https://www.[employer].com/en/know/clinical-effectiveness-terms. 2024© UpToDate, Inc. and its affiliates and/or licensors. All rights reserved.  Copyright   © 2024 UpToDate, Inc. and/or its affiliates. All rights reserved.

## 2025-01-22 ENCOUNTER — TELEPHONE (OUTPATIENT)
Age: 70
End: 2025-01-22

## 2025-01-22 DIAGNOSIS — M51.361 DEGENERATION OF INTERVERTEBRAL DISC OF LUMBAR REGION WITH LOWER EXTREMITY PAIN: Primary | ICD-10-CM

## 2025-01-22 DIAGNOSIS — G62.9 NEUROPATHY: ICD-10-CM

## 2025-01-22 NOTE — TELEPHONE ENCOUNTER
Patient called in to follow up on SANDHYA chacon and RN reviewed provider comments. Patient advised to take Tylenol 1000 mg twice a day. Patient reports not having a muscle relaxer to take as provider advised. Please follow up with patient on mobile # if provider sends in order to [facility] pharmacy. Permission to leave vm if not available.

## 2025-01-23 RX ORDER — METHOCARBAMOL 500 MG/1
500 TABLET, FILM COATED ORAL
Qty: 30 TABLET | Refills: 0 | Status: SHIPPED | OUTPATIENT
Start: 2025-01-23

## 2025-02-03 ENCOUNTER — TELEPHONE (OUTPATIENT)
Age: 70
End: 2025-02-03

## 2025-02-03 ENCOUNTER — CONSULT (OUTPATIENT)
Dept: PAIN MEDICINE | Facility: CLINIC | Age: 70
End: 2025-02-03
Payer: MEDICARE

## 2025-02-03 VITALS
HEIGHT: 68 IN | DIASTOLIC BLOOD PRESSURE: 80 MMHG | WEIGHT: 177 LBS | BODY MASS INDEX: 26.83 KG/M2 | HEART RATE: 78 BPM | SYSTOLIC BLOOD PRESSURE: 128 MMHG | RESPIRATION RATE: 16 BRPM

## 2025-02-03 DIAGNOSIS — M51.361 DEGENERATION OF INTERVERTEBRAL DISC OF LUMBAR REGION WITH LOWER EXTREMITY PAIN: ICD-10-CM

## 2025-02-03 DIAGNOSIS — M51.16 INTERVERTEBRAL DISC DISORDER WITH RADICULOPATHY OF LUMBAR REGION: Primary | ICD-10-CM

## 2025-02-03 DIAGNOSIS — M17.11 PRIMARY OSTEOARTHRITIS OF RIGHT KNEE: ICD-10-CM

## 2025-02-03 PROCEDURE — 99204 OFFICE O/P NEW MOD 45 MIN: CPT | Performed by: ANESTHESIOLOGY

## 2025-02-03 PROCEDURE — G2211 COMPLEX E/M VISIT ADD ON: HCPCS | Performed by: ANESTHESIOLOGY

## 2025-02-03 RX ORDER — DICLOFENAC SODIUM 75 MG/1
75 TABLET, DELAYED RELEASE ORAL 2 TIMES DAILY PRN
Qty: 30 TABLET | Refills: 0 | Status: SHIPPED | OUTPATIENT
Start: 2025-02-03

## 2025-02-03 NOTE — TELEPHONE ENCOUNTER
Caller: Guillermina pt    Doctor: Dr. Pisano    Reason for call: pt called stating she scheduled her MRI on 2/5.  When MRI is finalized pt would either a phone call or if can't get a hold of pt, then a my chart follow up with results    Call back#: [phone]

## 2025-02-03 NOTE — PROGRESS NOTES
Assessment  1. Intervertebral disc disorder with radiculopathy of lumbar region    2. Degeneration of intervertebral disc of lumbar region with lower extremity pain    3. Primary osteoarthritis of right knee        Plan  The patient symptoms, history/physical are consistent with pain that is multifactorial in origin and secondary to underlying multilevel degenerative disc disease and lumbar spondylosis which is leading to radicular symptoms into the legs.  I suspect she has underlying spinal stenosis as well.  At this time, I will order an MRI lumbar spine to better evaluate.  I advised her we will call with results and discuss treatment moving forward.  She would be interested in an interventional treatment.    For now, since gabapentin has not helped, I will have her start weaning off of that by taking 1 less capsule every 3 days and then discontinuing it.  She may also discontinue methocarbamol since that has not helped.  I will start her on diclofenac 75 mg twice daily as needed to help with pain.  I have given her a short prescription of 30 tablets to try.    My impressions and treatment recommendations were discussed in detail with the patient who verbalized understanding and had no further questions.  Discharge instructions were provided. I personally saw and examined the patient and I agree with the above discussed plan of care.    Orders Placed This Encounter   Procedures   • MRI lumbar spine without contrast     Standing Status:   Future     Expected Date:   2/3/2025     Expiration Date:   2/3/2029     Scheduling Instructions:      There is no preparation for this test. Please leave your jewelry and valuables at home, wedding rings are the exception. All patients will be required to change into a hospital gown and pants.  Street clothes are not permitted in the MRI.  Magnetic nail polish must be removed prior to arrival for your test. Please bring your insurance cards, a form of photo ID and a list of your  "medications with you. Arrive 15 minutes prior to your appointment time in order to register. Please bring any prior CT or MRI studies of this area that were not performed at a [facility] facility.            To schedule this appointment, please contact Central Scheduling at [phone].            Prior to your appointment, please make sure you complete the MRI Screening Form when you e-Check in for your appointment. This will be available starting 7 days before your appointment in [employer] [employer]. You may receive an e-mail with an activation code if you do not have a [employer] account. If you do not have access to a device, we will complete your screening at your appointment.     Reason for Exam:   lbp into legs     For OP exams needed \"URGENT\", choose the appropriate timeframe below and call Central Scheduling at [phone]. No need to speak with a Radiologist.:   Not URGENT     What is the patient's sedation requirement?:   No Sedation     Does the patient need medication for Claustrophobia? If yes, order medication at this point.:   No     Does the patient wear a life vest, have an implanted cardiac device, a stimulation device, a sleep apnea stimulator, or a breast tissue expansion device?:   No     Release to patient through [employer]   Immediate     New Medications Ordered This Visit   Medications   • diclofenac (VOLTAREN) 75 mg EC tablet     Sig: Take 1 tablet (75 mg total) by mouth 2 (two) times a day as needed (pain)     Dispense:  30 tablet     Refill:  0       History of Present Illness    Guillermina Cleveland is a 69 y.o. female referred by Dr. Padilla for lower back pain and bilateral calf pain that has been present for 1 year.  Symptoms are severe rated 8/10 on a numeric rating scale that is felt nearly constantly described to be sharp with weakness.  Symptoms are aggravated standing, walking, exercise.  She has been using gabapentin.  She has tried exercising and chiropractic ablation without relief.    I have " personally reviewed and/or updated the patient's past medical history, past surgical history, family history, social history, current medications, allergies, and vital signs today.     Review of Systems   Constitutional:  Negative for fever and unexpected weight change.   HENT:  Negative for trouble swallowing.    Eyes:  Negative for visual disturbance.   Respiratory:  Negative for shortness of breath and wheezing.    Cardiovascular:  Negative for chest pain and palpitations.   Gastrointestinal:  Negative for constipation, diarrhea, nausea and vomiting.   Endocrine: Negative for cold intolerance, heat intolerance and polydipsia.   Genitourinary:  Negative for difficulty urinating and frequency.   Musculoskeletal:  Positive for back pain, gait problem and joint swelling. Negative for arthralgias and myalgias.        B/L Calf Pain   Skin:  Negative for rash.   Neurological:  Negative for dizziness, seizures, syncope, weakness and headaches.   Hematological:  Does not bruise/bleed easily.   Psychiatric/Behavioral:  Negative for dysphoric mood.    All other systems reviewed and are negative.      Patient Active Problem List   Diagnosis   • PVD (peripheral vascular disease) (HCC)   • Venous stasis dermatitis of both lower extremities   • Varicose veins of left lower extremity with pain   • Primary osteoarthritis of right knee   • Open-angle glaucoma of both eyes   • Current mild episode of major depressive disorder without prior episode (HCC)       History reviewed. No pertinent past medical history.    Past Surgical History:   Procedure Laterality Date   • KNEE SURGERY Left    • SHOULDER SURGERY Right        History reviewed. No pertinent family history.    Social History     Occupational History   • Occupation:    Tobacco Use   • Smoking status: Former   • Smokeless tobacco: Never   • Tobacco comments:     2018. On and off 1-2 cigarettes a day but sometimes not   Vaping Use   • Vaping status: Never  "Used   Substance and Sexual Activity   • Alcohol use: Yes     Comment: wine at night on weekends   • Drug use: Never   • Sexual activity: Not Currently       Current Outpatient Medications on File Prior to Visit   Medication Sig   • fluticasone (FLONASE) 50 mcg/act nasal spray 2 sprays into each nostril daily   • gabapentin (Neurontin) 300 mg capsule 300 mg in am, 300 mg in pm, and 600 mg qhs   • methylPREDNISolone 4 MG tablet therapy pack Use as directed on package   • mometasone (NASONEX) 50 mcg/act nasal spray 2 sprays into each nostril daily   • timolol (TIMOPTIC) 0.5 % ophthalmic solution    • [DISCONTINUED] methocarbamol (ROBAXIN) 500 mg tablet Take 1 tablet (500 mg total) by mouth daily at bedtime as needed for muscle spasms     No current facility-administered medications on file prior to visit.       Allergies   Allergen Reactions   • Sulfa Antibiotics        Physical Exam    /80   Pulse 78   Resp 16   Ht 5' 8\" (1.727 m)   Wt 80.3 kg (177 lb)   BMI 26.91 kg/m²     Constitutional: normal, well developed, well nourished, alert, in no distress and non-toxic and no overt pain behavior.  Eyes: anicteric  HEENT: grossly intact  Neck: supple, symmetric, trachea midline and no masses   Pulmonary:even and unlabored  Cardiovascular:No edema or pitting edema present  Skin:Normal without rashes or lesions and well hydrated  Psychiatric:Mood and affect appropriate  Neurologic:Cranial Nerves II-XII grossly intact  Musculoskeletal:normal    Lumbar Spine Exam  Appearance:  Scoliosis  Palpation/Tenderness:  left lumbar paraspinal tenderness  right lumbar paraspinal tenderness  Range of Motion:  Flexion:  No limitation  without pain  Extension:  Minimally limited  with pain  Motor Strength:  Left hip flexion:  5/5  Left hip extension:  5/5  Right hip flexion:  5/5  Right hip extension:  5/5  Left knee flexion:  5/5  Left knee extension:  5/5  Right knee flexion:  5/5  Right knee extension:  5/5  Left foot " dorsiflexion:  5/5  Left foot plantar flexion:  5/5  Right foot dorsiflexion:  5/5  Right foot plantar flexion:  5/5    Imaging    XR SPINE LUMBAR MINIMUM 4 VIEWS NON INJURY (12/3/2024)     INDICATION: I73.9: Peripheral vascular disease, unspecified.     COMPARISON: Lumbar spine x-ray 11/17/2017     FINDINGS:     No acute fracture. Intact pedicles.     Five non-rib-bearing lumbar vertebral bodies.     Degenerative dextroscoliosis.     Grade 1 retrolisthesis of L1 on L2 and L2 on L3.     Intervertebral disc space narrowing at L1-2, L2-3 with endplate osteophytes.     Facet arthropathy at L4-5, L5-S1.     Unremarkable soft tissues.     IMPRESSION:     No acute osseous abnormality.     Degenerative changes as described.           Workstation performed: [id number]

## 2025-02-05 ENCOUNTER — HOSPITAL ENCOUNTER (OUTPATIENT)
Dept: MRI IMAGING | Facility: HOSPITAL | Age: 70
Discharge: HOME | End: 2025-02-05
Attending: ANESTHESIOLOGY
Payer: MEDICARE

## 2025-02-05 DIAGNOSIS — M51.16 INTERVERTEBRAL DISC DISORDER WITH RADICULOPATHY OF LUMBAR REGION: ICD-10-CM

## 2025-02-05 PROCEDURE — 72148 MRI LUMBAR SPINE W/O DYE: CPT

## 2025-02-06 ENCOUNTER — HOSPITAL ENCOUNTER (OUTPATIENT)
Facility: HOSPITAL | Age: 70
Setting detail: OBSERVATION
Discharge: HOME | End: 2025-02-07
Attending: STUDENT IN AN ORGANIZED HEALTH CARE EDUCATION/TRAINING PROGRAM
Payer: MEDICARE

## 2025-02-06 ENCOUNTER — APPOINTMENT (EMERGENCY)
Dept: RADIOLOGY | Facility: HOSPITAL | Age: 70
End: 2025-02-06
Payer: MEDICARE

## 2025-02-06 DIAGNOSIS — R26.2 AMBULATORY DYSFUNCTION: Primary | ICD-10-CM

## 2025-02-06 DIAGNOSIS — M25.561 RIGHT KNEE PAIN: ICD-10-CM

## 2025-02-06 DIAGNOSIS — M79.651 RIGHT THIGH PAIN: ICD-10-CM

## 2025-02-06 DIAGNOSIS — Z79.1 NSAID LONG-TERM USE: ICD-10-CM

## 2025-02-06 DIAGNOSIS — M79.604 RIGHT LEG PAIN: ICD-10-CM

## 2025-02-06 PROBLEM — I73.9 CLAUDICATION OF BOTH LOWER EXTREMITIES (HCC): Status: RESOLVED | Noted: 2023-02-23 | Resolved: 2025-02-06

## 2025-02-06 PROBLEM — K76.89 LIVER CYST: Status: RESOLVED | Noted: 2025-02-06 | Resolved: 2025-02-06

## 2025-02-06 PROBLEM — K76.89 LIVER CYST: Status: ACTIVE | Noted: 2025-02-06

## 2025-02-06 PROBLEM — M47.26 OTHER SPONDYLOSIS WITH RADICULOPATHY, LUMBAR REGION: Status: ACTIVE | Noted: 2025-02-06

## 2025-02-06 PROBLEM — G62.9 NEUROPATHY: Status: ACTIVE | Noted: 2025-02-06

## 2025-02-06 LAB
ANION GAP SERPL CALCULATED.3IONS-SCNC: 5 MMOL/L (ref 4–13)
BASOPHILS # BLD AUTO: 0.05 THOUSANDS/ΜL (ref 0–0.1)
BASOPHILS NFR BLD AUTO: 1 % (ref 0–1)
BUN SERPL-MCNC: 23 MG/DL (ref 5–25)
CALCIUM SERPL-MCNC: 9.3 MG/DL (ref 8.4–10.2)
CHLORIDE SERPL-SCNC: 108 MMOL/L (ref 96–108)
CO2 SERPL-SCNC: 27 MMOL/L (ref 21–32)
CREAT SERPL-MCNC: 0.46 MG/DL (ref 0.6–1.3)
EOSINOPHIL # BLD AUTO: 0.13 THOUSAND/ΜL (ref 0–0.61)
EOSINOPHIL NFR BLD AUTO: 2 % (ref 0–6)
ERYTHROCYTE [DISTWIDTH] IN BLOOD BY AUTOMATED COUNT: 12.3 % (ref 11.6–15.1)
GFR SERPL CREATININE-BSD FRML MDRD: 101 ML/MIN/1.73SQ M
GLUCOSE SERPL-MCNC: 97 MG/DL (ref 65–140)
HCT VFR BLD AUTO: 40.7 % (ref 34.8–46.1)
HGB BLD-MCNC: 13.7 G/DL (ref 11.5–15.4)
IMM GRANULOCYTES # BLD AUTO: 0.01 THOUSAND/UL (ref 0–0.2)
IMM GRANULOCYTES NFR BLD AUTO: 0 % (ref 0–2)
LYMPHOCYTES # BLD AUTO: 2.57 THOUSANDS/ΜL (ref 0.6–4.47)
LYMPHOCYTES NFR BLD AUTO: 35 % (ref 14–44)
MCH RBC QN AUTO: 32.9 PG (ref 26.8–34.3)
MCHC RBC AUTO-ENTMCNC: 33.7 G/DL (ref 31.4–37.4)
MCV RBC AUTO: 98 FL (ref 82–98)
MONOCYTES # BLD AUTO: 0.65 THOUSAND/ΜL (ref 0.17–1.22)
MONOCYTES NFR BLD AUTO: 9 % (ref 4–12)
NEUTROPHILS # BLD AUTO: 4.01 THOUSANDS/ΜL (ref 1.85–7.62)
NEUTS SEG NFR BLD AUTO: 53 % (ref 43–75)
NRBC BLD AUTO-RTO: 0 /100 WBCS
PLATELET # BLD AUTO: 236 THOUSANDS/UL (ref 149–390)
PLATELET # BLD AUTO: 249 THOUSANDS/UL (ref 149–390)
PMV BLD AUTO: 10.4 FL (ref 8.9–12.7)
PMV BLD AUTO: 10.7 FL (ref 8.9–12.7)
POTASSIUM SERPL-SCNC: 3.8 MMOL/L (ref 3.5–5.3)
RBC # BLD AUTO: 4.17 MILLION/UL (ref 3.81–5.12)
SODIUM SERPL-SCNC: 140 MMOL/L (ref 135–147)
WBC # BLD AUTO: 7.42 THOUSAND/UL (ref 4.31–10.16)

## 2025-02-06 PROCEDURE — 99222 1ST HOSP IP/OBS MODERATE 55: CPT | Performed by: HOSPITALIST

## 2025-02-06 PROCEDURE — 73502 X-RAY EXAM HIP UNI 2-3 VIEWS: CPT

## 2025-02-06 PROCEDURE — 99284 EMERGENCY DEPT VISIT MOD MDM: CPT

## 2025-02-06 PROCEDURE — 82550 ASSAY OF CK (CPK): CPT

## 2025-02-06 PROCEDURE — 80048 BASIC METABOLIC PNL TOTAL CA: CPT

## 2025-02-06 PROCEDURE — 36415 COLL VENOUS BLD VENIPUNCTURE: CPT

## 2025-02-06 PROCEDURE — 85049 AUTOMATED PLATELET COUNT: CPT

## 2025-02-06 PROCEDURE — 73564 X-RAY EXAM KNEE 4 OR MORE: CPT

## 2025-02-06 PROCEDURE — 99285 EMERGENCY DEPT VISIT HI MDM: CPT | Performed by: STUDENT IN AN ORGANIZED HEALTH CARE EDUCATION/TRAINING PROGRAM

## 2025-02-06 PROCEDURE — 96374 THER/PROPH/DIAG INJ IV PUSH: CPT

## 2025-02-06 PROCEDURE — 85025 COMPLETE CBC W/AUTO DIFF WBC: CPT

## 2025-02-06 RX ORDER — OXYCODONE HYDROCHLORIDE 5 MG/1
5 TABLET ORAL EVERY 4 HOURS PRN
Refills: 0 | Status: DISCONTINUED | OUTPATIENT
Start: 2025-02-06 | End: 2025-02-07 | Stop reason: HOSPADM

## 2025-02-06 RX ORDER — TIMOLOL MALEATE 5 MG/ML
1 SOLUTION/ DROPS OPHTHALMIC 2 TIMES DAILY
Status: DISCONTINUED | OUTPATIENT
Start: 2025-02-06 | End: 2025-02-07 | Stop reason: HOSPADM

## 2025-02-06 RX ORDER — ACETAMINOPHEN 325 MG/1
650 TABLET ORAL EVERY 6 HOURS PRN
Status: DISCONTINUED | OUTPATIENT
Start: 2025-02-06 | End: 2025-02-07 | Stop reason: HOSPADM

## 2025-02-06 RX ORDER — TIMOLOL MALEATE 5 MG/ML
1 SOLUTION/ DROPS OPHTHALMIC DAILY
Status: DISCONTINUED | OUTPATIENT
Start: 2025-02-07 | End: 2025-02-06

## 2025-02-06 RX ORDER — METHOCARBAMOL 500 MG/1
500 TABLET, FILM COATED ORAL ONCE
Status: COMPLETED | OUTPATIENT
Start: 2025-02-06 | End: 2025-02-06

## 2025-02-06 RX ORDER — TRIAMCINOLONE ACETONIDE 55 UG/1
1 SPRAY, METERED NASAL DAILY
COMMUNITY

## 2025-02-06 RX ORDER — HYDROMORPHONE HCL IN WATER/PF 6 MG/30 ML
0.2 PATIENT CONTROLLED ANALGESIA SYRINGE INTRAVENOUS EVERY 2 HOUR PRN
Refills: 0 | Status: DISCONTINUED | OUTPATIENT
Start: 2025-02-06 | End: 2025-02-07 | Stop reason: HOSPADM

## 2025-02-06 RX ORDER — HYDROMORPHONE HCL IN WATER/PF 6 MG/30 ML
0.2 PATIENT CONTROLLED ANALGESIA SYRINGE INTRAVENOUS ONCE
Status: DISCONTINUED | OUTPATIENT
Start: 2025-02-06 | End: 2025-02-06

## 2025-02-06 RX ORDER — LIDOCAINE 50 MG/G
1 PATCH TOPICAL ONCE
Status: COMPLETED | OUTPATIENT
Start: 2025-02-06 | End: 2025-02-07

## 2025-02-06 RX ORDER — FLUTICASONE PROPIONATE 50 MCG
2 SPRAY, SUSPENSION (ML) NASAL DAILY
Status: DISCONTINUED | OUTPATIENT
Start: 2025-02-07 | End: 2025-02-07 | Stop reason: HOSPADM

## 2025-02-06 RX ORDER — DICLOFENAC SODIUM 75 MG/1
75 TABLET, DELAYED RELEASE ORAL 2 TIMES DAILY PRN
Status: DISCONTINUED | OUTPATIENT
Start: 2025-02-06 | End: 2025-02-06

## 2025-02-06 RX ORDER — ENOXAPARIN SODIUM 100 MG/ML
40 INJECTION SUBCUTANEOUS DAILY
Status: DISCONTINUED | OUTPATIENT
Start: 2025-02-07 | End: 2025-02-07 | Stop reason: HOSPADM

## 2025-02-06 RX ORDER — IBUPROFEN 600 MG/1
600 TABLET, FILM COATED ORAL EVERY 6 HOURS
Status: DISCONTINUED | OUTPATIENT
Start: 2025-02-06 | End: 2025-02-07 | Stop reason: HOSPADM

## 2025-02-06 RX ORDER — FENTANYL CITRATE 50 UG/ML
50 INJECTION, SOLUTION INTRAMUSCULAR; INTRAVENOUS ONCE
Refills: 0 | Status: COMPLETED | OUTPATIENT
Start: 2025-02-06 | End: 2025-02-06

## 2025-02-06 RX ORDER — MOMETASONE FUROATE MONOHYDRATE 50 UG/1
2 SPRAY, METERED NASAL DAILY
Status: DISCONTINUED | OUTPATIENT
Start: 2025-02-07 | End: 2025-02-06

## 2025-02-06 RX ORDER — LIDOCAINE 50 MG/G
1 PATCH TOPICAL DAILY
Status: DISCONTINUED | OUTPATIENT
Start: 2025-02-07 | End: 2025-02-07 | Stop reason: HOSPADM

## 2025-02-06 RX ORDER — ACETAMINOPHEN 325 MG/1
975 TABLET ORAL ONCE
Status: COMPLETED | OUTPATIENT
Start: 2025-02-06 | End: 2025-02-06

## 2025-02-06 RX ADMIN — FENTANYL CITRATE 50 MCG: 50 INJECTION INTRAMUSCULAR; INTRAVENOUS at 14:46

## 2025-02-06 RX ADMIN — LIDOCAINE 1 PATCH: 50 PATCH CUTANEOUS at 15:56

## 2025-02-06 RX ADMIN — METHOCARBAMOL 500 MG: 500 TABLET ORAL at 15:09

## 2025-02-06 RX ADMIN — IBUPROFEN 600 MG: 600 TABLET, FILM COATED ORAL at 22:17

## 2025-02-06 RX ADMIN — TIMOLOL MALEATE 1 DROP: 5 SOLUTION OPHTHALMIC at 23:30

## 2025-02-06 RX ADMIN — Medication 2.5 MG: at 16:54

## 2025-02-06 RX ADMIN — ACETAMINOPHEN 975 MG: 325 TABLET, FILM COATED ORAL at 15:09

## 2025-02-06 NOTE — ASSESSMENT & PLAN NOTE
Previous diagnosed with neuropathy secondary to degenerative back changes  Patient was weaned off gabapentin by Pain management outpaitent   Denies tingling or knumbness in legs

## 2025-02-06 NOTE — ASSESSMENT & PLAN NOTE
Recently referred to spine and pain management due to degeneration of intervertebral disc of lumbar region with lower extremity pain. Follows Dr. Browne and resumed PT. MRI of lumbar spine on 2/5/2024: lumbar stenosis and herniation. Weaned patient off gabapentin and started her on diclofenac 75 mg twice a day.   Creatinine on admission within normal  Plan   Ibuprofen while inpatient

## 2025-02-06 NOTE — ASSESSMENT & PLAN NOTE
Suspect neuro claudication from lumbar stenosis.  Currently working with physical therapy and has seen her chiropractor.  X-ray showed degenerative disc disease from L1-L3.      Continue Mobic 15 mg daily and increase gabapentin to 300 mg 3 times daily.  Referred to pain management for possible intervention.      meloxicam (Mobic) 15 mg tablet; Take 1 tablet (15 mg total) by mouth daily  Follows Pain management

## 2025-02-06 NOTE — ED ATTENDING ATTESTATION
I, Yoli Beth MD, saw and evaluated the patient. I have discussed the patient with the resident/non-physician practitioner and agree with the resident's/non-physician practitioner's findings, Plan of Care, and MDM as documented in the resident's/non-physician practitioner's note, except where noted. All available labs and Radiology studies were reviewed.  I was present for key portions of any procedure(s) performed by the resident/non-physician practitioner and I was immediately available to provide assistance.       At this point I agree with the current assessment done in the Emergency Department.  I have conducted an independent evaluation of this patient a history and physical is as follows:    Subjective: 69-year-old female with history of right knee osteoarthritis, peripheral vascular disease, restless leg syndrome, radiculopathy presents with right leg pain sudden onset today while ambulating.  She reports that she had sharp pain radiating from the knee down to foot without associated numbness/tingling/weakness, back pain, leg swelling, or any other complaints.  She denies trauma, history of malignancy/IV drug abuse, fevers, saddle anesthesia, bowel/bladder incontinence, urinary retention/constipation, or any other complaints.  She reports that she has previously had similar symptoms but these are worse than prior.    Objective: Signs stable and afebrile.  No midline spinal TTP/stepoffs/deformities. 5/5 strength with SILT throughout bilateral lower extremities.  2+ DP/PT pulses bilaterally.  No knee tenderness to palpation or effusion on examination but patient has significant pain with weightbearing on the right lower extremity and cannot ambulate without assistance.    Assessment/Plan: Elderly female with history of knee OA, lumbar radiculopathy, restless leg syndrome, peripheral vascular disease presenting with sudden onset right lower leg pain without evidence of neurovascular compromise and with no red  flag symptoms for back pain.  Given that her pain is worsened with weightbearing will obtain plain films of the knee and provide analgesia.    Plain films of knee and hip with OA but no acute fx. Patient unable to ambulate despite adequate analgesia.     Patient admitted to [facility] for ambulatory dysfunction.

## 2025-02-06 NOTE — H&P
H&P - Hospitalist   Name: Guillermina Cleveland 69 y.o. female I MRN: [id number]  Unit/Bed#: DIS-03 I Date of Admission: 2/6/2025   Date of Service: 2/6/2025 I Hospital Day: 0     Assessment & Plan  Ambulatory dysfunction  At baseline patient can walk using a cane    Presents post injury of right thigh while walking into work this morning  Patient had an injury to left leg 3 weeks ago, since then has been shifting most of her weight on the right leg  Not able to stand up and walk around independently without support.  Previous imaging outpatient X-ray showed degenerative disc disease from L1-L3, MRI spine lumbar spondylosis with herniation    Some improvement in pain level after receiving medications in the ED  On physical exam: right knee is swollen, patient cannot bend right knee, no saddle anesthesia, no motor weakness-patient can elevate legs, dorsiflex, and planterflex, palpable dorsalis pulses bilaterally, no pinpoint back or paraspinal tenderness, some reproducible pain of back of right thigh (there is a lidocaine patch there),   Labs unremarkable except for some leukocytosis likely reactive   Xray hip hip pelvis no acute fracture, Xray knee no acute fracture. Some improvement to pain medication in the ED     Plan  Pain medication regimen   oxycodone 2.5 mild and moderate pain   oxycodone 5 mg for severe pain   IV dilaudid for breakthrough pain    Lidocaine patch on right thigh  PT   OT   May need Walker on Discharge    Primary osteoarthritis of right knee  Working with physical therapy outpatient   Voltaren 75 mg tablet   Plan   Ibuprofen while inpatient  Spondylosis with radiculopathy, lumbar region    Recently referred to spine and pain management due to degeneration of intervertebral disc of lumbar region with lower extremity pain. Follows Dr. Browne and resumed PT. MRI of lumbar spine on 2/5/2024: lumbar stenosis and herniation. Weaned patient off gabapentin and started her on diclofenac 75 mg twice a day.    Creatinine on admission within normal  Plan   Ibuprofen while inpatient   Neuropathy  Previous diagnosed with neuropathy secondary to degenerative back changes  Patient was weaned off gabapentin by Pain management outpaitent   Denies tingling or knumbness in legs       VTE Pharmacologic Prophylaxis:   Moderate Risk (Score 3-4) - Pharmacological DVT Prophylaxis Ordered: enoxaparin (Lovenox).  Code Status:   Discussion with family: Updated  (ex-) at bedside.    Anticipated Length of Stay: Patient will be admitted on an observation basis with an anticipated length of stay of less than 2 midnights secondary to pain management and PT evaluation.    History of Present Illness   Chief Complaint: Acute onset of right leg pain    Guillermina Cleveland is a 69 y.o. female with a PMH of a h/o lower back pain and bilateral calf pain, peripheral vascular disease, venous stasis, arthritis of right knee, who presents with  ambulatory dysfunction secondary to severe pain on her right thigh.     Recently had MRI recently to evaluate back as cause of RLE pain.  Throbbing pain mainly at the backside of her right leg.  Started after patient had walked into work. She had twisted her ankle and hurt her left knee while cleaning up snow from her car 3 weeks ago. Since then has been wobbling and pivoting to manage that pain. She feels like she has been putting more weight on the right leg. On onset of right thigh pain she had her cane but was unable to move. Denied fall. Pain levels was a +10 . Her ex  helped bring her here to [facility].  Patient describes this right lower extremity thigh pain is separate from her previously experienced sciatic pain.  Denies urinary retention or saddle anesthesia,     On admission ED workup patient had  Xray hip hip pelvis no acute fracture, Xray knee no acute fracture. Received  fentaly 50 mg IV injection tyelenol 975, lidoocaine patch, robaxin 500 mg, and oxycodone 2.5 mg    She  rates current pain at a 7 after receiving pain medications. Has been urinating needed some help getting to the restroom.  Patient does not feel comfortable walking around at this time.    Hemodynamically stable. On my exam patient right knee is swollen more than left with some tenderness to palpation, is able to lift her legs completely, but can't bend her right knee much.  Pulses appreciated, there is no saddle anesthesia.         Review of Systems   Constitutional:  Negative for chills and fever.   HENT:  Negative for ear pain and sore throat.    Eyes:  Negative for pain and visual disturbance.   Respiratory:  Negative for cough and shortness of breath.    Cardiovascular:  Negative for chest pain and palpitations.   Gastrointestinal:  Negative for abdominal pain and vomiting.   Endocrine: Negative.    Genitourinary:  Negative for dysuria, hematuria and urgency.   Musculoskeletal:  Positive for arthralgias, back pain, gait problem and myalgias. Negative for neck pain and neck stiffness.   Skin:  Negative for color change and rash.   Neurological:  Negative for seizures and syncope.   Hematological: Negative.    Psychiatric/Behavioral: Negative.     All other systems reviewed and are negative.      Historical Information   History reviewed. No pertinent past medical history.  Past Surgical History:   Procedure Laterality Date    KNEE SURGERY Left     SHOULDER SURGERY Right      Social History     Tobacco Use    Smoking status: Former    Smokeless tobacco: Never    Tobacco comments:     2018. On and off 1-2 cigarettes a day but sometimes not   Vaping Use    Vaping status: Never Used   Substance and Sexual Activity    Alcohol use: Yes     Comment: wine at night on weekends    Drug use: Never    Sexual activity: Not Currently     E-Cigarette/Vaping    E-Cigarette Use Never User      E-Cigarette/Vaping Substances       Social History:  Marital Status:    Occupation: works   Patient Pre-hospital Living Situation:  Home  Patient Pre-hospital Level of Mobility: walks with cane  Patient Pre-hospital Diet Restrictions: Regular    Meds/Allergies   I have reviewed home medications with patient personally.  Prior to Admission medications    Medication Sig Start Date End Date Taking? Authorizing Provider   diclofenac (VOLTAREN) 75 mg EC tablet Take 1 tablet (75 mg total) by mouth 2 (two) times a day as needed (pain) 2/3/25   Fan Pisano MD   fluticasone (FLONASE) 50 mcg/act nasal spray 2 sprays into each nostril daily 3/12/12   Historical Provider, MD   gabapentin (Neurontin) 300 mg capsule 300 mg in am, 300 mg in pm, and 600 mg qhs 1/14/25   Endy Padilla MD   methylPREDNISolone 4 MG tablet therapy pack Use as directed on package 1/14/25   Endy Padilla MD   mometasone (NASONEX) 50 mcg/act nasal spray 2 sprays into each nostril daily    Historical Provider, MD   timolol (TIMOPTIC) 0.5 % ophthalmic solution  2/20/23   Historical Provider, MD     Allergies   Allergen Reactions    Sulfa Antibiotics        Objective :  Temp:  [97.6 °F (36.4 °C)] 97.6 °F (36.4 °C)  HR:  [81] 81  BP: (152)/(74) 152/74  Resp:  [16] 16  SpO2:  [98 %] 98 %  O2 Device: None (Room air)    Physical Exam  Constitutional:       General: She is in acute distress.   HENT:      Head: Normocephalic and atraumatic.      Nose: No congestion or rhinorrhea.   Eyes:      Extraocular Movements: Extraocular movements intact.      Conjunctiva/sclera: Conjunctivae normal.      Pupils: Pupils are equal, round, and reactive to light.   Cardiovascular:      Rate and Rhythm: Normal rate and regular rhythm.      Pulses: Normal pulses.      Heart sounds: Normal heart sounds.   Pulmonary:      Effort: Pulmonary effort is normal.      Breath sounds: Normal breath sounds. No stridor. No wheezing, rhonchi or rales.   Abdominal:      General: Abdomen is flat. Bowel sounds are normal. There is no distension.      Palpations: Abdomen is soft.      Tenderness: There is no  "guarding or rebound.   Musculoskeletal:         General: Swelling present.      Cervical back: Normal range of motion.      Comments: Right knee swelling  Mild reproducible pain of the right thigh    No paraspinal or spinal tenderness     Skin:     General: Skin is warm.      Findings: No bruising.      Comments: Venous stasis dermatitis   Neurological:      General: No focal deficit present.      Mental Status: She is alert and oriented to person, place, and time. Mental status is at baseline.      Cranial Nerves: No cranial nerve deficit.      Sensory: No sensory deficit.      Gait: Gait abnormal.   Psychiatric:         Mood and Affect: Mood normal.         Behavior: Behavior normal.         Thought Content: Thought content normal.         Judgment: Judgment normal.          Lines/Drains: peripheral IV left arm            Lab Results: I have reviewed the following results:  Results from last 7 days   Lab Units 02/06/25  1738   WBC Thousand/uL 7.42   HEMOGLOBIN g/dL 13.7   HEMATOCRIT % 40.7   PLATELETS Thousands/uL 249   SEGS PCT % 53   LYMPHO PCT % 35   MONO PCT % 9   EOS PCT % 2     Results from last 7 days   Lab Units 02/06/25  1738   SODIUM mmol/L 140   POTASSIUM mmol/L 3.8   CHLORIDE mmol/L 108   CO2 mmol/L 27   BUN mg/dL 23   CREATININE mg/dL 0.46*   ANION GAP mmol/L 5   CALCIUM mg/dL 9.3   GLUCOSE RANDOM mg/dL 97             No results found for: \"HGBA1C\"              Administrative Statements       ** Please Note: This note has been constructed using a voice recognition system. **    "

## 2025-02-06 NOTE — LETTER
[facility]  [address] [facility]  [place] [address]  No information on file.    February 10, 2025     Patient: Guillermina Cleveland   YOB: 1955   Date of Visit: 2/6/2025       To Whom it May Concern:    Guillermina Cleveland is under my professional care. She was seen in the hospital from 2/6/2025 to 2/7/2025. She may return to work on 2/17/25 with the following limitations : weight restriction as tolerated .    If you have any questions or concerns, please don't hesitate to call.         Sincerely,          Jm Rios, DO             June 17, 2021     Referral [address] [place] [id number]    Patient: Sim Serrano   YOB: 1965   Date of Visit: 6/17/2021       Dear Dr Krystin Bajwa:    Thank you for referring Sim Serrano to me for evaluation  Below are my notes for this consultation  If you have questions, please do not hesitate to call me  I look forward to following your patient along with you  Sincerely,        One Colin Lee,         CC: Tomasa Pete MD  Temple Lizbethmanjinder Dowd,   6/17/2021 10:30 AM  [facility]  Gastroenterology Outpatient Follow up  Patient Sim Serrano   Age 61 y o  Gender male   MRN: [id number]  [facility] [id number]     ASSESSMENT AND PLAN:   Problem List Items Addressed This Visit        Digestive    GERD without esophagitis - Primary     Ubaldo's reflux is under very good control with Nexium 40 mg 2 to 3 times a week  He really has breakthrough heartburn  Lifestyle modifications for gastroesophageal reflux disease were discussed and include limiting fried and fatty foods, mints, chocolates, carbonated and caffeinated beverages , and alcohol, etc   Avoid lying down for 2-3 hours after meals  If you have nighttime symptoms consider raising the head of the bed up on 4-6 inch blocks  Pillows typically are not useful  If you are overweight, weight loss will be helpful  Relevant Orders    EGD    MORALES (nonalcoholic steatohepatitis)     Manpreet Gonzalez does have fatty liver disease which is felt to be related to MORALES with possible component of OBDULIA  He also has a component of iron overload  Prior FribroScan did reveal F4 (cirrhosis) however the last study that I had to review revealed F3, however he has had 1 since then, I do not have the results we will also request that  Continue to abstain from alcohol  Try to maintain tight control of diabetes  He reports his last hemoglobin A1c was 6 8    Continue to strive to lose weight and exercise to bring his body mass index down to a more acceptable range  Continue check alpha fetoprotein and ultrasound every 6 months  He does report being vaccinated for both hepatitis a and hepatitis-B  Relevant Orders    AFP tumor marker    Protime-INR    US abdomen complete with doppler    CBC    Comprehensive metabolic panel    Iron Panel (Includes Ferritin, Iron Sat%, Iron, and TIBC)    Cirrhosis of liver without ascites, unspecified hepatic cirrhosis type ([address] )     I do not have the results of his last Fibro Scan however a prior Fibroscan did reveal F4, and a FibroScan from 2018 did revealed F 3 ,  Either way continue tight management diabetes, weight loss, tight control of lipids  Check ultrasound and alpha fetoprotein every 6 months  Follow-up with hepatology             Other    History of Jerez's esophagus     Patient's last EGD was in March 2018  Biopsies were negative for Jerez's office at that time however a previous endoscopy did suggest Jerez's esophagus  He did have a single short tongue of salmon-colored mucosa in the distal esophagus  He is due for repeat upper endoscopy  This is being done for 2 reasons 1 for esophageal variceal surveillance in addition to evaluation or possible Jerez's esophagus  Continue Nexium         Relevant Orders    EGD    History of colon polyps     Jelena Sweeney does have a history of colon polyps  He is due for colonoscopy  His colonoscopy was canceled/reschedule due to COVID  He will receive a Colyte preparation in split dose with the 2nd dose completed 3 hours prior to arrival   Two bisacodyl tablets  Hold metformin for the day the preparation and the procedure  Take 1/2 of the Amaryl dose a day the preparation  I explained to the patient the procedure of colonoscopy as well as its potential risks which are approximately 3 in 1000 chance of bleeding, infection, and perforation  Perforation may require a surgeon to repair it    I also explained the small but significant chance of missing lesions with colonoscopy which has been reported to be about 5-10%  Relevant Orders    Colonoscopy    Obesity (BMI 30 0-34  9)    Hemochromatosis, unspecified     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  His iron studies have been elevated  He has been undergoing phlebotomy on the direction of his hepatologist Dr Jackeline Watson, however has not had a phlebotomy since around November of 2020  He did have some recent laboratory test done which showed we will obtain and review  He will be following up with Dr Jackeline Watson  Relevant Orders    Iron Panel (Includes Ferritin, Iron Sat%, Iron, and TIBC)         _____________________________________________________________    HPI:   Glen Brambila is a delightful 69-year-old gentleman who I am seeing in follow-up for reflux, MORALES, and history of colon polyps  He reports that he has managed to lose 15 lb since I last saw him back in February 2020  He denies any peripheral edema, jaundice, increased abdominal girth or abdominal pain  Denies any confusion  His reflux is well controlled on Nexium 40 mg 2 to 3 times a week  There has been no dysphagia nausea vomiting or early satiety  He did have COVID in in April  He states it took him about 3 weeks to recover  He did have some significant respiratory symptoms during that time period  He did not have any gastrointestinal symptoms  His bowel habits are very regular, denies any diarrhea, constipation, bleeding or black stools  He has not seen Dr Jackeline Watson since February of 2020  He has not had a phlebotomy since November of 2021         Records reviewed prior to office visit  Saw hepatologyDebbie on February 7, 2020 who felt that Glen Brambila has metabolic syndrome, obesity, iron overload (even though he is homozygous for the H 63D mutation in the HFE gene) and has been previously noted to have an early stage cirrhosis based upon a fibro-scan due to the combination of fatty liver disease (MORALES) and iron overload  06/01/2018 fibro-scan F 3, significant fibrosis without cirrhosis  03/16/2018 EGD normal duodenal bulb, biopsies negative for celiac disease  Patchy erythema antrum, biopsies negative for H pylori  5 mm polyp in the antrum, biopsy unremarkable  Small linear erosion in the gastric fundus  Biopsy revealed chronic inflammation with focal erosion  Z-line was irregular at 40 cm, short tongue of salmon-colored mucosa noted, biopsy negative for Jerez's  Polyp at 25 cm in the esophagus, 4 mm, fibroepithelial polyp  01/24/2020 ultrasound Doppler  Note this was not a complete ultrasound  No sonographic evidence for portal or hepatic vein thrombosis  01/11/2019 ultrasound abdomen severely increased echogenicity seen throughout the liver  1x 7 x 1 1 cm cyst anterior segment right lobe liver  Gallbladder mildly distended at 9 8 cm  Spleen mildly enlarged at 12 6 cm      11/14/2016 colonoscopy pediatric scope  Good prep  Normal terminal ileum  4 mm polyp proximal ascending colon, adenomatous  6 mm polyp proximal transverse colon, hyperplastic, 7 mm polyp distal transverse colon, adenomatous  Few diverticula sigmoid colon  Small hemorrhoids      No Known Allergies    No Known Allergies  Current Outpatient Medications   Medication Sig Dispense Refill    desloratadine (Clarinex) 5 MG tablet Take one tablet by mouth daily      esomeprazole (NexIUM) 40 MG capsule as needed       glimepiride (AMARYL) 1 mg tablet       metFORMIN (GLUCOPHAGE) 1000 MG tablet       pravastatin (PRAVACHOL) 40 mg tablet       Dulaglutide (Trulicity) 3 94 W[place]/2 1XT SOPN Inject 0 5 mL under the skin      metFORMIN (GLUCOPHAGE) 850 mg tablet take 1 tablet by oral route 2 times every day with morning and evening meals      polyethylene glycol (COLYTE) 4000 mL solution Take 240 g by mouth once for 1 dose 4000 mL 0    senna-docusate sodium (SENOKOT-S) 8 6-50 mg per tablet take 2 tablets day of preparation as directed, see instructions       No current facility-administered medications for this visit  MEDICAL HISTORY:  Past Medical History:   Diagnosis Date    Abnormal LFTs     Colon polyps     DM (diabetes mellitus), type 2 (HCC)     Fatty liver     GERD (gastroesophageal reflux disease)     Hemochromatosis     MORALES (nonalcoholic steatohepatitis)     Obesity      Past Surgical History:   Procedure Laterality Date    HERNIA REPAIR       Social History     Substance and Sexual Activity   Alcohol Use Yes     Social History     Substance and Sexual Activity   Drug Use Not on file     Social History     Tobacco Use   Smoking Status Never Smoker   Smokeless Tobacco Never Used     Family History   Problem Relation Age of Onset    Melanoma Mother     Heart disease Father     Cancer Sister          Objective   Blood pressure 164/98, pulse 85, temperature 98 1 °F (36 7 °C), resp  rate 14, height 5' 10" (1 778 m), weight 103 kg (227 lb 6 4 oz)  Body mass index is 32 63 kg/m²  PHYSICAL EXAM:   General Appearance: Alert, cooperative, no distress  HEENT: Normocephalic, atraumatic, anicteric  Neck: Supple, symmetrical, trachea midline  Lungs: Clear to auscultation bilaterally; no rales, rhonchi or wheezing; respirations unlabored   Heart: Regular rate and rhythm; no murmur, rub, or gallop  Abdomen: Soft, bowel sounds normal, non-tender, non-distended; no masses,   No spider angiomas  Liver is about 20 cm midclavicular line  Spleen tip is not palpable  There are no spider angiomas or any other stigmata of chronic liver disease     Small umbilical hernia  Genitalia: Deferred   Rectal: Deferred   Extremities: No cyanosis, clubbing or edema   Skin: No jaundice, rashes, or lesions   Lymph nodes: No palpable cervical lymphadenopathy   Lab Results:   No visits with results within 2 Month(s) from this visit  Latest known visit with results is:   No results found for any previous visit       Radiology Results:   No results found  One Colin Lee, DO   06/17/21   Cc:    One Colin Lee, DO  6/16/2021  9:33 PM  Sign when Signing Visit  [facility]  Gastroenterology Outpatient Follow up  Patient Dov Bell   Age 61 y o  Gender male   MRN: [id number]  [facility] [id number]     ASSESSMENT AND PLAN:   Problem List Items Addressed This Visit     None         _____________________________________________________________    HPI:  See note in [facility] hepatology, Geroge Service on February 7, 2020 who felt that Melo Rojas has metabolic syndrome, obesity, iron overload (even though he is homozygous for the H 63D mutation in the HFE gene) and has been previously noted to have an early stage cirrhosis based upon a fibro-scan due to the combination of fatty liver disease (MORALES) and iron overload  06/01/2018 fibro-scan after 3, significant fibrosis without cirrhosis  03/16/2018 EGD normal duodenal bulb, biopsies negative for celiac disease  Patchy erythema antrum, biopsies negative for H pylori  5 mm polyp in the antrum, biopsy unremarkable  Small linear erosion in the gastric fundus  Biopsy revealed chronic inflammation with focal erosion  Z-line was irregular at 40 cm, short tongue of salmon-colored mucosa noted, biopsy negative for Jerez's  Polyp at 25 cm in the esophagus, 4 mm, fibroepithelial polyp  01/24/2020 ultrasound Doppler  Note this was not a complete ultrasound  No sonographic evidence for portal or hepatic vein thrombosis  01/11/2019 ultrasound abdomen severely increased echogenicity seen throughout the liver  1x 7 x 1 1 cm cyst anterior segment right lobe liver  Gallbladder mildly distended at 9 8 cm  Spleen mildly enlarged at 12 6 cm      11/14/2016 colonoscopy pediatric scope  Good prep  Normal terminal ileum  4 mm polyp proximal ascending colon, adenomatous  6 mm polyp proximal transverse colon, hyperplastic, 7 mm polyp distal transverse colon, adenomatous    Few diverticula sigmoid colon  Small hemorrhoids  No Known Allergies    No Known Allergies  Current Outpatient Medications   Medication Sig Dispense Refill    desloratadine (Clarinex) 5 MG tablet Take one tablet by mouth daily      Dulaglutide (Trulicity) 0 12 REYNA/1 1TC SOPN Inject 0 5 mL under the skin      esomeprazole (NexIUM) 40 MG capsule as needed       glimepiride (AMARYL) 1 mg tablet       metFORMIN (GLUCOPHAGE) 1000 MG tablet       metFORMIN (GLUCOPHAGE) 850 mg tablet take 1 tablet by oral route 2 times every day with morning and evening meals      polyethylene glycol (COLYTE) 4000 mL solution Take 240 g by mouth once for 1 dose 4000 mL 0    pravastatin (PRAVACHOL) 40 mg tablet       senna-docusate sodium (SENOKOT-S) 8 6-50 mg per tablet take 2 tablets day of preparation as directed, see instructions       No current facility-administered medications for this visit  MEDICAL HISTORY:  Past Medical History:   Diagnosis Date    Abnormal LFTs     Colon polyps     DM (diabetes mellitus), type 2 (HCC)     Fatty liver     GERD (gastroesophageal reflux disease)     Hemochromatosis     MORALES (nonalcoholic steatohepatitis)     Obesity      Past Surgical History:   Procedure Laterality Date    HERNIA REPAIR       Social History     Substance and Sexual Activity   Alcohol Use Yes     Social History     Substance and Sexual Activity   Drug Use Not on file     Social History     Tobacco Use   Smoking Status Never Smoker   Smokeless Tobacco Never Used     Family History   Problem Relation Age of Onset    Melanoma Mother     Heart disease Father     Cancer Sister        REVIEW OF SYSTEMS:  CONSTITUTIONAL: Denies any fever, chills, rigors, and weight loss  HEENT: No earache or tinnitus  Denies hearing loss or visual disturbances  CARDIOVASCULAR: No chest pain or palpitations  RESPIRATORY: Denies any cough, hemoptysis, shortness of breath or dyspnea on exertion    GASTROINTESTINAL: As noted in the History of Present Illness  GENITOURINARY: No problems with urination  Denies any hematuria or dysuria  NEUROLOGIC: No dizziness or vertigo, denies headaches  MUSCULOSKELETAL: Denies any muscle or joint pain  SKIN: Denies skin rashes or itching  ENDOCRINE: Denies excessive thirst  Denies intolerance to heat or cold  PSYCHOSOCIAL: Denies depression or anxiety  Denies any recent memory loss  Objective   There were no vitals taken for this visit  There is no height or weight on file to calculate BMI  PHYSICAL EXAM:   General Appearance: Alert, cooperative, no distress  HEENT: Normocephalic, atraumatic, anicteric  Neck: Supple, symmetrical, trachea midline  Lungs: Clear to auscultation bilaterally; no rales, rhonchi or wheezing; respirations unlabored   Heart: Regular rate and rhythm; no murmur, rub, or gallop  Abdomen: Soft, bowel sounds normal, non-tender, non-distended; no masses, there is no hepatosplenomegaly  No spider angiomas  Genitalia: Deferred   Rectal: Deferred   Extremities: No cyanosis, clubbing or edema   Skin: No jaundice, rashes, or lesions   Lymph nodes: No palpable cervical lymphadenopathy   Lab Results:   No visits with results within 2 Month(s) from this visit  Latest known visit with results is:   No results found for any previous visit  Radiology Results:   No results found    One Colin Lee DO   06/16/21   Cc:

## 2025-02-06 NOTE — LETTER
[facility]  [address] [facility]  [place] [address]  No information on file.    February 7, 2025     Patient: Guillermina Cleveland   YOB: 1955   Date of Visit: 2/6/2025       To Whom it May Concern:    Guillermina Cleveland is under my professional care. She was seen in the hospital from 2/6/2025 to 02/07/25. She may return to work on 2/11/25 with the following limitations : weight restriction as tolerated .    If you have any questions or concerns, please don't hesitate to call.         Sincerely,          Jm Rios, DO

## 2025-02-06 NOTE — ED PROVIDER NOTES
Time reflects when diagnosis was documented in both MDM as applicable and the Disposition within this note       Time User Action Codes Description Comment    2/6/2025  6:08 PM YeungKathi lo CRIS Add [R26.2] Ambulatory dysfunction     2/6/2025  6:08 PM Hector Yeungesteban LYNCH Add [M79.661] Pain in right lower leg     2/6/2025  6:08 PM Yeung Kathi LYNCH Remove [M79.661] Pain in right lower leg     2/6/2025  6:08 PM Kathi Yeung Add [M25.561] Right knee pain     2/6/2025  6:08 PM Hector Yeungesteban LYNCH Add [M79.604] Right leg pain           ED Disposition       ED Disposition   Admit    Condition   Stable    Date/Time   u Feb 6, 2025  6:09 PM    Comment   Case was discussed with NATASHA and the patient's admission status was agreed to be Admission Status: observation status to the service of Dr. Dougherty .               Assessment & Plan       Medical Decision Making  Amount and/or Complexity of Data Reviewed  Labs: ordered. Decision-making details documented in ED Course.  Radiology: ordered and independent interpretation performed. Decision-making details documented in ED Course.    Risk  OTC drugs.  Prescription drug management.  Decision regarding hospitalization.    70 yo F presented to ED for RLE pain. Associated symptoms: unable to ambulate. Exam findings: crying, acute d/t pain, had to lift patient into bed, pain with any ROM of hip or knee. Initially unable to examine d/t pain. NVID after pain control. Normal patellar and Achilles reflexes bilaterally. No BLE edema, no palpable cords    Differentials diagnoses considered: fracture vs severe OA vs lumbar radiculopathy vs other.     See ED course for more details on lab and imaging interpretation, as well as pertinent information that occurred during patient's ED stay.  Based on these results and H&P,fracture unlikely, most likely lumbar radicular pain vs severe OA. Results and clinical impressions were discussed with patient and family. They expressed understanding.  Plan: admit to medicine for pain control. This plan was also discussed with patient, who was agreeable with this plan. Patient was given the opportunity to ask questions in ED. All questions and concerns were addressed in ED.     This document was created using speech voice recognition software.   Grammatical errors, random word insertions, pronoun errors, and incomplete sentences are an occasional consequence of this system due to software limitations, ambient noise, and hardware issues.   Any formal questions or concerns about content, text, or information contained within the body of this dictation should be directly addressed to the provider for clarification.     ED Course as of 02/06/25 1943   u Feb 06, 2025   1609 XR knee 4+ views Right injury  No tibial platue fracture visualized. Osteoarthritis to knee joint but otherwise unremarkable   1715 Unable to ambulate d/t pain   1729 XR hip/pelv 2-3 vws right  IMPRESSION:     No acute osseous abnormality. Left greater than right hip degenerative change.     1745 CBC and differential  No leukocytosis or leukopenia.  Hemoglobin hematocrit within normal limits.  Platelets within normal limits.  Reassuring differential.    1757 Second attempt walking, using nursing staff for help, has 13 steps at home. Unable to walk up steps at this time.    1810 Basic metabolic panel(!)  unremarkable       Medications   lidocaine (LIDODERM) 5 % patch 1 patch (1 patch Topical Medication Applied 2/6/25 1556)   acetaminophen (TYLENOL) tablet 975 mg (975 mg Oral Given 2/6/25 1509)   fentaNYL injection 50 mcg (50 mcg Intravenous Given 2/6/25 1446)   methocarbamol (ROBAXIN) tablet 500 mg (500 mg Oral Given 2/6/25 1509)   oxyCODONE (ROXICODONE) split tablet 2.5 mg (2.5 mg Oral Given 2/6/25 1654)       ED Risk Strat Scores                                              History of Present Illness       Chief Complaint   Patient presents with    Leg Pain     Right leg pain. Started in calf 10  am today then started to hurt in knee also and has been getting worse. Has hx of R leg pain and is to get injections in knee. Had MRI of back yesterday because they think that may be causing some of the leg pain        History reviewed. No pertinent past medical history.   Past Surgical History:   Procedure Laterality Date    KNEE SURGERY Left     SHOULDER SURGERY Right       History reviewed. No pertinent family history.   Social History     Tobacco Use    Smoking status: Former    Smokeless tobacco: Never    Tobacco comments:     2018. On and off 1-2 cigarettes a day but sometimes not   Vaping Use    Vaping status: Never Used   Substance Use Topics    Alcohol use: Yes     Comment: wine at night on weekends    Drug use: Never      E-Cigarette/Vaping    E-Cigarette Use Never User       E-Cigarette/Vaping Substances      I have reviewed and agree with the history as documented.     HPI  69-year-old M with h/o OA, MDD, varicose veins presenting to ED with RLE pain started at approx 10 AM. Started in R posterior knee and radiated to calf. Now pain is radiating from medial aspect of R knee into anterior aspect of lower leg . Weightbearing makes symptoms worse. Has only taken Voltaren for pain today but prior to onset of pain. Denies fever, chills, CP, SOB, BLE edema, bowel/bladder incontinence, paresthesias, saddle anesthesia, focal weakness, and any other sxs.      Review of Systems  ROS otherwise negative unless stated above.       Objective       ED Triage Vitals [02/06/25 1411]   Temperature Pulse Blood Pressure Respirations SpO2 Patient Position - Orthostatic VS   97.6 °F (36.4 °C) 81 152/74 16 98 % Sitting      Temp Source Heart Rate Source BP Location FiO2 (%) Pain Score    Oral Monitor Right arm -- 8      Vitals      Date and Time Temp Pulse SpO2 Resp BP Pain Score FACES Pain Rating User   02/06/25 1752 -- -- -- -- -- 5 -- BS   02/06/25 1509 -- -- -- -- -- 10 - Worst Possible Pain -- BS   02/06/25 1411 -- -- --  -- -- 8 -- RLN   02/06/25 1411 97.6 °F (36.4 °C) 81 98 % 16 152/74 -- -- AB            Physical Exam  General appearance: acute distress d/t pain   HENT: Normocephalic, atraumatic, hearing grossly intact, and mucous membranes moist   Eyes: Conjunctiva normal, PERRL, EOM intact   Lungs/Chest: Respirations even and unlabored, breath sounds normal  Cardiovascular: Normal rate, regular rhythm  Abdomen: soft, non-distended, non-tender  Musculoskeletal: extremities no cyanosis or edema    Back: no midline tenderness, no paraspinal tenderness   RLE: pain with any ROM of hip or knee. Initially unable to examine d/t pain. Inability to bear weight on RLE d/t pain. NVID after pain control.   BLE: Normal patellar and Achilles reflexes bilaterally. No BLE edema, no palpable cords   Pulses: radial / dorsalis pedis pulses palpable  Skin: warm and dry   Neurologic: Awake and alert, no apparent focal deficit  Psych: Mood consistent with affect     Results Reviewed       Procedure Component Value Units Date/Time    Basic metabolic panel [[id number]]  (Abnormal) Collected: 02/06/25 1738    Lab Status: Final result Specimen: Blood from Arm, Left Updated: 02/06/25 1758     Sodium 140 mmol/L      Potassium 3.8 mmol/L      Chloride 108 mmol/L      CO2 27 mmol/L      ANION GAP 5 mmol/L      BUN 23 mg/dL      Creatinine 0.46 mg/dL      Glucose 97 mg/dL      Calcium 9.3 mg/dL      eGFR 101 ml/min/1.73sq m     Narrative:      National Kidney Disease Foundation guidelines for Chronic Kidney Disease (CKD):     Stage 1 with normal or high GFR (GFR > 90 mL/min/1.73 square meters)    Stage 2 Mild CKD (GFR = 60-89 mL/min/1.73 square meters)    Stage 3A Moderate CKD (GFR = 45-59 mL/min/1.73 square meters)    Stage 3B Moderate CKD (GFR = 30-44 mL/min/1.73 square meters)    Stage 4 Severe CKD (GFR = 15-29 mL/min/1.73 square meters)    Stage 5 End Stage CKD (GFR <15 mL/min/1.73 square meters)  Note: GFR calculation is accurate only with a steady state  creatinine    CBC and differential [[id number]] Collected: 02/06/25 1738    Lab Status: Final result Specimen: Blood from Arm, Left Updated: 02/06/25 1745     WBC 7.42 Thousand/uL      RBC 4.17 Million/uL      Hemoglobin 13.7 g/dL      Hematocrit 40.7 %      MCV 98 fL      MCH 32.9 pg      MCHC 33.7 g/dL      RDW 12.3 %      MPV 10.4 fL      Platelets 249 Thousands/uL      nRBC 0 /100 WBCs      Segmented % 53 %      Immature Grans % 0 %      Lymphocytes % 35 %      Monocytes % 9 %      Eosinophils Relative 2 %      Basophils Relative 1 %      Absolute Neutrophils 4.01 Thousands/µL      Absolute Immature Grans 0.01 Thousand/uL      Absolute Lymphocytes 2.57 Thousands/µL      Absolute Monocytes 0.65 Thousand/µL      Eosinophils Absolute 0.13 Thousand/µL      Basophils Absolute 0.05 Thousands/µL             XR hip/pelv 2-3 vws right   Final Interpretation by Mitch Tejeda MD (02/06 1722)      No acute osseous abnormality. Left greater than right hip degenerative change.         Computerized Assisted Algorithm (CAA) may have been used to analyze all applicable images.            Workstation performed: [id number]         XR knee 4+ views Right injury   ED Interpretation by Kathi Yeung DO (02/06 1636)   No tibial platue fracture visualized. Osteoarthritis to knee joint but otherwise unremarkable      Final Interpretation by Maye Morrison MD (02/06 1642)      No acute osseous abnormality.      Moderate tricompartmental osteoarthritis.         Computerized Assisted Algorithm (CAA) may have been used to analyze all applicable images.         Workstation performed: [id number]             Procedures        ED Medication and Procedure Management   Prior to Admission Medications   Prescriptions Last Dose Informant Patient Reported? Taking?   diclofenac (VOLTAREN) 75 mg EC tablet   No No   Sig: Take 1 tablet (75 mg total) by mouth 2 (two) times a day as needed (pain)   fluticasone (FLONASE) 50 mcg/act nasal  spray  Self Yes No   Si sprays into each nostril daily   gabapentin (Neurontin) 300 mg capsule   No No   Si mg in am, 300 mg in pm, and 600 mg qhs   methylPREDNISolone 4 MG tablet therapy pack   No No   Sig: Use as directed on package   mometasone (NASONEX) 50 mcg/act nasal spray  Self Yes No   Si sprays into each nostril daily   timolol (TIMOPTIC) 0.5 % ophthalmic solution  Self Yes No      Facility-Administered Medications: None     Patient's Medications   Discharge Prescriptions    No medications on file     No discharge procedures on file.  ED SEPSIS DOCUMENTATION   Time reflects when diagnosis was documented in both MDM as applicable and the Disposition within this note       Time User Action Codes Description Comment    2025  6:08 PM Kathi Yeung [R26.2] Ambulatory dysfunction     2025  6:08 PM Kathi Yeung Add [M79.661] Pain in right lower leg     2025  6:08 PM Kathi Yeung Remove [M79.661] Pain in right lower leg     2025  6:08 PM Kathi Yeung Add [M25.561] Right knee pain     2025  6:08 PM Kathi Yeung Add [M79.604] Right leg pain                  Kathi Yeung DO  25 [id number]

## 2025-02-06 NOTE — ASSESSMENT & PLAN NOTE
Working with physical therapy outpatient   Voltaren 75 mg tablet   Plan   Ibuprofen while inpatient

## 2025-02-06 NOTE — ASSESSMENT & PLAN NOTE
Possible 1.2 cm cyst in the right lobe of liver, incompletely characterized. Follow-up right upper quadrant ultrasound advised

## 2025-02-07 VITALS
HEIGHT: 68 IN | DIASTOLIC BLOOD PRESSURE: 73 MMHG | OXYGEN SATURATION: 98 % | TEMPERATURE: 97.7 F | RESPIRATION RATE: 20 BRPM | HEART RATE: 69 BPM | WEIGHT: 177 LBS | SYSTOLIC BLOOD PRESSURE: 123 MMHG | BODY MASS INDEX: 26.83 KG/M2

## 2025-02-07 LAB
CK SERPL-CCNC: 50 U/L (ref 26–192)
DME PARACHUTE DELIVERY DATE ACTUAL: NORMAL
DME PARACHUTE DELIVERY DATE REQUESTED: NORMAL
DME PARACHUTE ITEM DESCRIPTION: NORMAL
DME PARACHUTE ITEM DESCRIPTION: NORMAL
DME PARACHUTE ORDER STATUS: NORMAL
DME PARACHUTE SUPPLIER NAME: NORMAL
DME PARACHUTE SUPPLIER PHONE: NORMAL

## 2025-02-07 PROCEDURE — 99239 HOSP IP/OBS DSCHRG MGMT >30: CPT | Performed by: INTERNAL MEDICINE

## 2025-02-07 PROCEDURE — 97163 PT EVAL HIGH COMPLEX 45 MIN: CPT

## 2025-02-07 PROCEDURE — 97166 OT EVAL MOD COMPLEX 45 MIN: CPT

## 2025-02-07 PROCEDURE — 97116 GAIT TRAINING THERAPY: CPT

## 2025-02-07 RX ORDER — PANTOPRAZOLE SODIUM 40 MG/1
40 TABLET, DELAYED RELEASE ORAL
Status: DISCONTINUED | OUTPATIENT
Start: 2025-02-07 | End: 2025-02-07 | Stop reason: HOSPADM

## 2025-02-07 RX ORDER — PANTOPRAZOLE SODIUM 40 MG/1
40 TABLET, DELAYED RELEASE ORAL
Qty: 30 TABLET | Refills: 0 | Status: SHIPPED | OUTPATIENT
Start: 2025-02-08 | End: 2025-03-10

## 2025-02-07 RX ORDER — METHOCARBAMOL 750 MG/1
750 TABLET, FILM COATED ORAL EVERY 6 HOURS PRN
Status: DISCONTINUED | OUTPATIENT
Start: 2025-02-07 | End: 2025-02-07

## 2025-02-07 RX ORDER — METHOCARBAMOL 750 MG/1
750 TABLET, FILM COATED ORAL EVERY 8 HOURS PRN
Qty: 15 TABLET | Refills: 0 | Status: SHIPPED | OUTPATIENT
Start: 2025-02-07 | End: 2025-02-12 | Stop reason: SDUPTHER

## 2025-02-07 RX ORDER — LIDOCAINE 50 MG/G
1 PATCH TOPICAL DAILY
Qty: 14 PATCH | Refills: 0 | Status: SHIPPED | OUTPATIENT
Start: 2025-02-07

## 2025-02-07 RX ORDER — METHOCARBAMOL 750 MG/1
750 TABLET, FILM COATED ORAL EVERY 8 HOURS SCHEDULED
Status: DISCONTINUED | OUTPATIENT
Start: 2025-02-07 | End: 2025-02-07 | Stop reason: HOSPADM

## 2025-02-07 RX ORDER — POLYETHYLENE GLYCOL 3350 17 G/17G
17 POWDER, FOR SOLUTION ORAL DAILY
Status: DISCONTINUED | OUTPATIENT
Start: 2025-02-07 | End: 2025-02-07 | Stop reason: HOSPADM

## 2025-02-07 RX ADMIN — OXYCODONE HYDROCHLORIDE 5 MG: 5 TABLET ORAL at 14:18

## 2025-02-07 RX ADMIN — OXYCODONE HYDROCHLORIDE 5 MG: 5 TABLET ORAL at 08:52

## 2025-02-07 RX ADMIN — IBUPROFEN 600 MG: 600 TABLET, FILM COATED ORAL at 03:16

## 2025-02-07 RX ADMIN — ENOXAPARIN SODIUM 40 MG: 30 INJECTION SUBCUTANEOUS at 08:52

## 2025-02-07 RX ADMIN — POLYETHYLENE GLYCOL 3350 17 G: 17 POWDER, FOR SOLUTION ORAL at 08:52

## 2025-02-07 RX ADMIN — METHOCARBAMOL TABLETS 750 MG: 750 TABLET, COATED ORAL at 06:17

## 2025-02-07 RX ADMIN — METHOCARBAMOL 750 MG: 750 TABLET ORAL at 14:17

## 2025-02-07 RX ADMIN — TIMOLOL MALEATE 1 DROP: 5 SOLUTION OPHTHALMIC at 08:52

## 2025-02-07 RX ADMIN — LIDOCAINE 1 PATCH: 50 PATCH CUTANEOUS at 14:18

## 2025-02-07 RX ADMIN — FLUTICASONE PROPIONATE 2 SPRAY: 50 SPRAY, METERED NASAL at 10:44

## 2025-02-07 RX ADMIN — IBUPROFEN 600 MG: 600 TABLET, FILM COATED ORAL at 08:53

## 2025-02-07 RX ADMIN — IBUPROFEN 600 MG: 600 TABLET, FILM COATED ORAL at 14:18

## 2025-02-07 RX ADMIN — PANTOPRAZOLE SODIUM 40 MG: 40 TABLET, DELAYED RELEASE ORAL at 06:18

## 2025-02-07 NOTE — ASSESSMENT & PLAN NOTE
Secondary to right thigh pain  Twisted left ankle and her left knee while cleaning snow 3 weeks ago. Since then, she feels as if she had been bearing more weight on right leg  No red flag signs  Physical exam with 5/5 strength in BLE. Limited R hip flexion secondary to pain. Right leg with negative straight leg test  MRI L-spine (2/5) -multilevel spondylosis  XR right knee - no acute osseous abnormality.  Moderate tricompartmental osteoarthritis  XR right knee/pelvis - no acute osseous abnormality.  Left greater than right hip degenerative changes  Pain improved with Tylenol, Robaxin, a lidocaine patch, and 1 dose of oxycodone  Etiology is likely MSK related  PT/OT recommended level III    Plan:  Discharge home on Robaxin and lidocaine patches  Continue home diclofenac tablets  Follow-up with primary care physician

## 2025-02-07 NOTE — OCCUPATIONAL THERAPY NOTE
"    Occupational Therapy Evaluation     Patient Name: Guillermina Cleveland  Today's Date: 2/7/2025  Problem List  Principal Problem:    Ambulatory dysfunction  Active Problems:    Primary osteoarthritis of right knee    Neuropathy    Spondylosis with radiculopathy, lumbar region    Past Medical History  History reviewed. No pertinent past medical history.  Past Surgical History  Past Surgical History:   Procedure Laterality Date    KNEE SURGERY Left     SHOULDER SURGERY Right         02/07/25 0956   OT Last Visit   OT Visit Date 02/07/25  (Friday)   Note Type   Note type Evaluation  (Eval [id number])   Additional Comments Identifiedpt by full name and birthdate. Prefers \"Milie or Guillermina\"   Pain Assessment   Pain Assessment Tool 0-10   Pain Score 5   Pain Location/Orientation Orientation: Right;Location: Knee   Pain Onset/Description Other (Comment)  (stiff, sore)   Effect of Pain on Daily Activities limits pace and activity tolerance during ADLs   Patient's Stated Pain Goal No pain   Hospital Pain Intervention(s) Repositioned;Ambulation/increased activity;Emotional support  (Jojo SAEZ medicated prior to eval)   Restrictions/Precautions   Weight Bearing Precautions Per Order No  (imaging R hip/pelvis and knee negative for fracture)   Braces or Orthoses Other (Comment)  (neoprene / elastic knee brace in room w/ belongings, not wearing)   Other Precautions Chair Alarm;Bed Alarm;Fall Risk;Pain   Home Living   Type of Home House  (flight of stairs from garage in lower level)   Home Layout One level;Performs ADLs on one level;Able to live on main level with bedroom/bathroom;Stairs to enter with rails  (ascending R rail; ledge on L)   Bathroom Shower/Tub Tub/shower unit   Bathroom Toilet Raised   Bathroom Equipment   (no DME)   Bathroom Accessibility Accessible   Home Equipment Cane   Additional Comments Pt reports living alone w/ her cats in 1 SH w/ flight of stairs to enter from garage vs + steps at front door. Pt reports " "her home is built into / on a hill.   Prior Function   Level of [place] Independent with ADLs;Independent with functional mobility;Independent with IADLS   Lives With Alone   Receives Help From Other (Comment)  (supportive ex-)   IADLs Independent with driving;Independent with meal prep;Independent with medication management   Falls in the last 6 months 0   Vocational Full time employment   Comments Pt reports I w/ ADL/ IADL at true baseline w/ out use of AD. Using cane prior to admission due to L LE injury   Lifestyle   Autonomy Pt reports I w/ ADLs at baseline and has been using cane since L LE injury   Reciprocal Relationships Pt reports living alone w/ her cats. Supportive ex- present and able to assist at DC   Service to Others Pt reports working full time as    Intrinsic Gratification Enjoys shopping and watching [employer] on tv. Cats   General   Additional Pertinent History Pt admitted to [facility] on [facility]/25 w/ R LE pain. Diagnosed w/ ambulatory dysfunction   Family/Caregiver Present Yes  (ex )   Additional General Comments Personal and environmental factors supporting performance includes first floor set- up, independent at baseline; barriers include difficulty managing stairs, pain   Subjective   Subjective \"I am glad you are here. I would like to try to use the bathroom\"   ADL   Where Assessed Edge of bed   Eating Assistance 7  Independent   Grooming Assistance 5  Supervision/Setup   Grooming Deficit Setup;Supervision/safety;Standing with assistive device   UB Bathing Assistance Unable to assess   LB Bathing Assistance Unable to assess   UB Dressing Assistance 7  Independent  (seated at EOB)   UB Dressing Deficit Setup   LB Dressing Assistance 4  Minimal Assistance   LB Dressing Deficit Setup  (thread R LE into pants over foot and to stand, manage up and over hips)   Toileting Assistance  5  Supervision/Setup   Toileting Deficit Setup;Grab bar use;Increased time to " complete   Bed Mobility   Supine to Sit 5  Supervision   Additional items Assist x 1;Increased time required  (to pt's L)   Sit to Supine Unable to assess   Additional Comments Pt seated OOB in chair post eval w/ needs met, call bell in reach and chair alarm activated   Transfers   Sit to Stand 4  Minimal assistance  (min A progressing to S)   Additional items Assist x 1;Bedrails;Armrests;Increased time required;Verbal cues  (elevated bed height)   Stand to Sit 4  Minimal assistance  (min A progressing to S)   Additional items Assist x 1;Increased time required;Verbal cues;Bedrails;Armrests   Toilet transfer 4  Minimal assistance   Additional items Assist x 1;Verbal cues  (L grab bar use. Educated pt on walker mgmt, hand placement and R LE position for comfort)   Additional Comments Pt performed sit <>stand 3X w/ min A initially progressing to S   Functional Mobility   Functional Mobility 5  Supervision   Additional Comments to/ from bathroom using RW   Additional items Rolling walker   Balance   Static Sitting Fair +   Static Standing Fair   Ambulatory Fair  (using RW)   Activity Tolerance   Activity Tolerance Patient limited by pain;Patient tolerated treatment well   Medical Staff Made Aware communicated w/ PT, MEERA and Luly PALUMBO via EPIC secure chat; Spoke w/ MD   Nurse Made Aware spoke w/ Jojo SAEZ pre / post eval   RUE Assessment   RUE Assessment WFL   RUE Strength   RUE Overall Strength Within Functional Limits - able to perform ADL tasks with strength   LUE Assessment   LUE Assessment WFL   LUE Strength   LUE Overall Strength Within Functional Limits - able to perform ADL tasks with strength   Hand Function   Gross Motor Coordination Functional   Fine Motor Coordination Functional   Sensation   Light Touch No apparent deficits   Vision-Basic Assessment   Current Vision Wears glasses all the time   Cognition   Overall Cognitive Status WFL   Arousal/Participation Alert;Cooperative   Attention Within functional  limits   Orientation Level Oriented X4   Memory Within functional limits   Following Commands Follows all commands and directions without difficulty   Comments Alert, oriented and able to follow directions, communicate wants / needs   Assessment   Limitation Decreased ADL status;Decreased endurance;Decreased self-care trans;Decreased high-level ADLs  (impaired pain, standing tolerance)   Assessment Pt is a 68yo female admitted to [facility] on 2/6/25 w/ R LE pain. Diagnosed w/ ambulatory dysfunction and imaging negative for acute fracture. Significant PMH Impacting her occupational performance includes neuropathy, depression, osteoarthritis R knee, lumbar spondylosis w/ radiculopathy. Pt reports living alone in 1 SH w/ flight of stairs to enter. Pt reports I w/ ADL/ IADL at true baseline w/ out use of AD. Pt reports use of cane prior to admission due to recent L LE injury. Upon eval, pt alert and oriented. Able to follow directions and participate in conversation. Pt required S grooming, I UBD, min A LBD, S toileting, S bed mobility, min A progressing to S sit to stand and S using RW for functional mobility. Pt is completing ADLs below baseline level of I and would benefit from OT in acute care to max I w/ ADLs, achieve highest level of function. Recommend no post acute OT rehab needs when medically stable for discharge but would benefit from PT, use of RW and commode. Will continue to follow in acute care.   Goals   Patient Goals Pt stated that she wants to have decreased pain and return home   LTG Time Frame 7-10   Long Term Goal see below   Plan   Treatment Interventions ADL retraining;Functional transfer training;Endurance training;Equipment evaluation/education;Patient/family training;Energy conservation;Activityengagement   Goal Expiration Date 02/12/25   OT Treatment Day 0  (Friday 2/7/25)   OT Frequency 2-3x/wk   Discharge Recommendation   Rehab Resource Intensity Level, OT No post-acute rehabilitation needs    Equipment Recommended Bedside commode  (recommend use of RW at this time)   Commode Type Standard   AM-PAC Daily Activity Inpatient   Lower Body Dressing 3   Bathing 3   Toileting 3   Upper Body Dressing 4   Grooming 4   Eating 4   Daily Activity Raw Score 21   Daily Activity Standardized Score (Calc for Raw Score >=11) 44.27   AM-PAC Applied Cognition Inpatient   Following a Speech/Presentation 4   Understanding Ordinary Conversation 4   Taking Medications 4   Remembering Where Things Are Placed or Put Away 4   Remembering List of 4-5 Errands 4   Taking Care of Complicated Tasks 4   Applied Cognition Raw Score 24   Applied Cognition Standardized Score 62.21   Barthel Index   Feeding 10   Bathing 0   Grooming Score 5   Dressing Score 5   Bladder Score 10   Bowels Score 10   Toilet Use Score 5   Transfers (Bed/Chair) Score 10   Mobility (Level Surface) Score 0   Stairs Score 5   Barthel Index Score 60   End of Consult   Education Provided Yes;Family or social support of family present for education by provider   Patient Position at End of Consult Bedside chair;Bed/Chair alarm activated;All needs within reach   Nurse Communication Nurse aware of consult   Licensure   [place] License Number  Radha EMILEE. Sadaf, OTR/L             The patient's raw score on the AM-PAC Daily Activity Inpatient Short Form is 21. A raw score of greater than or equal to 19 suggests the patient may benefit from discharge to home. Please refer to the recommendation of the Occupational Therapist for safe discharge planning.      Pt goals to be met by 2/12/25 to max I w/ ADLs and improve engagement to return home to PLOF w/ cats to baseline level of I includes:    -Pt will complete bed mobility supine <>sit independently in preparation for ADLs    -Pt will consistently engage in functional mobility using LRAD household distances w/ mod I for + time    -Pt will complete functional transfers to bed, chair, and toilet using LRAD, DME as needed  independently    -Pt will complete tub /shower transfer using LRAD w/ mod I for + time    -Pt will complete LBD w/ mod I for + time using LHAE as needed    -Pt will demonstrate improved functional standing tolerance for at least 15 minutes using LRAD as needed w/ at least fair + balance to max I w/ bathing and IADLs to return home alone to PLOF.     -Pt will demonstrate improved AMPAC score to at least 24 to max I w/ ADLs, assist in DC planning.       Radha Talavera, OTR/L  [id number]  [id number]

## 2025-02-07 NOTE — PHYSICAL THERAPY NOTE
PHYSICAL THERAPY EVALUATION NOTE    Patient Name: Guillermina Cleveland  Today's Date: 2/7/2025  AGE:   69 y.o.  Mrn:   [id number]  ADMIT DX:  Right leg pain [M79.604]  Right knee pain [M25.561]  Ambulatory dysfunction [R26.2]    History reviewed. No pertinent past medical history.  Length Of Stay: 0  PHYSICAL THERAPY EVALUATION :    02/07/25 1212   PT Last Visit   PT Visit Date 02/07/25   Pain Assessment   Pain Assessment Tool 0-10   Pain Score No Pain  (at rest, 5/10 w/ activity)   Pain Location/Orientation Orientation: Right;Location: Knee   Hospital Pain Intervention(s) Repositioned;Ambulation/increased activity   Restrictions/Precautions   Other Precautions Chair Alarm;Bed Alarm;Fall Risk;Pain   Home Living   Type of Home House   Home Layout One level;Other (Comment)  (2+2 AIMEE w/ railing)   Additional Comments lives alone. ambulates w/ quad cane. no other DME. independent w/ ADLs and IADLs. no falls in last 6 months.   General   Additional Pertinent History room air resting pulse ox 96% and 82 BPM.   Family/Caregiver Present No   Cognition   Overall Cognitive Status WFL   Arousal/Participation Alert   Attention Within functional limits   Orientation Level Oriented to person;Other (Comment)  (pt was identified w/ full name, birth date)   Following Commands Follows one step commands without difficulty   Subjective   Subjective pt seen supine in bed. agreed to PT eval. reports having right knee pain w/ activity.   RUE Assessment   RUE Assessment WFL   LUE Assessment   LUE Assessment WFL   RLE Assessment   RLE Assessment WFL  (hip 4-/5, knee 3+/5, ankle 4-/5)   LLE Assessment   LLE Assessment WFL  (4/5)   Vision-Basic Assessment   Current Vision Wears glasses all the time   Light Touch   RLE Light Touch Grossly intact   LLE Light Touch Grossly intact   Transfers   Sit to Stand 5  Supervision   Additional items Increased time required    Stand to Sit 5  Supervision  (poorly controlled descent to chair)   Additional items Verbal cues  (for body positioning, controlled descent)   Ambulation/Elevation   Gait pattern Antalgic;Decreased R stance;Foward flexed;Short stride;Excessively slow   Gait Assistance 4  Minimal assist   Additional items Assist x 1;Verbal cues  (for cane positioning, sequencing)   Assistive Device SPC   Distance 30 feet  (additional ambulation not possible due to fatigue, pain)   Stair Management Assistance 5  Supervision   Additional items Verbal cues;Increased time required  (for sequencing)   Stair Management Technique Two rails;Step to pattern   Number of Stairs 2   Balance   Static Sitting Good   Dynamic Sitting Fair   Static Standing Fair -  (w/ cane)   Ambulatory Poor +  (w/ cane)   Activity Tolerance   Activity Tolerance Patient limited by fatigue;Patient limited by pain   Nurse Made Aware spoke to Luly Dumont CM   Assessment   Problem List Decreased strength;Decreased endurance;Impaired balance;Decreased mobility;Decreased safety awareness;Pain   Assessment Pt presents with ambulatory dysfunction secondary to severe pain on her right thigh. Dx: ambulatory dysfunction, right knee OA, lumbar spine spondylosis, and neuropathy. order placed for PT eval and tx, w/ activity order of fall precautions and out of bed to chair. pt presents w/ comorbidities of low back and bilateral calf pain, PVD, venous stasis, left knee surgery, right shoulder surgery, and arthritis and personal factors of mobilizing w/ assistive device, stair(s) to enter home, and limited home support. pt presents w/ pain, weakness, decreased endurance, impaired balance, gait deviations, decreased safety awareness, and fall risk. these impairments are evident in findings from physical examination (weakness), mobility assessment (need for standby to min assist w/ all phases of mobility when usually mobilizing independently, tolerance to only 30 feet of  ambulation, and need for cueing for mobility technique), and Barthel Index: 60/100. pt needed input for mobility technique and body mechanics. pt is at risk for falls due to physical and safety awareness deficits. pt's clinical presentation is unstable/unpredictable (evident in need for assist w/ all phases of mobility when usually mobilizing independently, tolerance to only 30 feet of ambulation, pain impacting overall mobility status, and need for input for mobility technique/safety). pt needs inpatient PT tx to improve mobility deficits and progress mobility training as appropriate.   Goals   Patient Goals I want to go home today   STG Expiration Date 02/21/25   Short Term Goal #1 pt will: Increase bilateral LE strength 1/2 grade to facilitate independent mobility, Perform bed mobility independently to increase level of independence, Perform all transfers independently to improve independence, Ambulate 300 ft. with least restrictive assistive device independently w/o LOB to improve functional independence, Navigate 10 stair(s) independently with unilateral handrail to facilitate return to previous living environment, Increase ambulatory balance 1 grade to decrease risk for falls, Complete exercise program independently to increase strength and endurance, Tolerate 3 hr OOB to faciliate upright tolerance, Tolerate standing 3 minutes independently to facilitate functional task performance, Improve Barthel Index score to 80 or greater to facilitate independence, and Complete Timed Up and Go or Comfortable Gait Speed to further assess mobility and monitor progress   PT Treatment Day 1   Plan   Treatment/Interventions Functional transfer training;Elevations;LE strengthening/ROM;Therapeutic exercise;Endurance training;Compensatory technique education;Gait training;Bed mobility;Equipment eval/education;Patient/family training   PT Frequency 2-3x/wk   Discharge Recommendation   Rehab Resource Intensity Level, PT III  (Minimum Resource Intensity)   Equipment Recommended Walker   Walker Package Recommended Wheeled walker   Change/add to Walker Package? No   AM-PAC Basic Mobility Inpatient   Turning in Flat Bed Without Bedrails 4   Lying on Back to Sitting on Edge of Flat Bed Without Bedrails 3   Moving Bed to Chair 3   Standing Up From Chair Using Arms 3   Walk in Room 3   Climb 3-5 Stairs With Railing 3   Basic Mobility Inpatient Raw Score 19   Basic Mobility Standardized Score 42.48   [facility] Highest Level Of Mobility   -HL Goal 6: Walk 10 steps or more   -HLM Achieved 7: Walk 25 feet or more   Barthel Index   Feeding 10   Bathing 0   Grooming Score 5   Dressing Score 5   Bladder Score 10   Bowels Score 10   Toilet Use Score 5   Transfers (Bed/Chair) Score 10   Mobility (Level Surface) Score 0   Stairs Score 5   Barthel Index Score 60   Additional Treatment Session   Start Time 1212   End Time 1222   Treatment Assessment Pt agreed to participate in PT intervention. Therapist incorporated roller walker use w/ ambulation to improve gait stability.  Sit <---> stand transfer w/ supervision. ambulated 90 feet w/ roller walker w/ supervision. Additional ambulation was not possible due to fatigue and pain. Pt was noted to have improvement w/ use of roller walker w/ increased ambulation distance and decreased level of assist to maintain safety. continued inpatient PT tx is indicated to reduce fall risk.   Equipment Use roller walker1   Additional Treatment Day 1   End of Consult   Patient Position at End of Consult Bedside chair;Bed/Chair alarm activated;All needs within reach     The patient's AM-PAC Basic Mobility Inpatient Short Form Raw Score is 19. A Raw score of greater than 16 suggests the patient may benefit from discharge to home. Please also refer to the recommendation of the Physical Therapist for safe discharge planning.    Skilled PT recommended while in hospital and upon DC to progress pt toward treatment goals.      Fox Ramos, PT

## 2025-02-07 NOTE — PLAN OF CARE
Problem: PHYSICAL THERAPY ADULT  Goal: Performs mobility at highest level of function for planned discharge setting.  See evaluation for individualized goals.  Description: Treatment/Interventions: Functional transfer training, Elevations, LE strengthening/ROM, Therapeutic exercise, Endurance training, Compensatory technique education, Gait training, Bed mobility, Equipment eval/education, Patient/family training  Equipment Recommended: Walker       See flowsheet documentation for full assessment, interventions and recommendations.  Note:    Problem List: Decreased strength, Decreased endurance, Impaired balance, Decreased mobility, Decreased safety awareness, Pain  Assessment: Pt presents with ambulatory dysfunction secondary to severe pain on her right thigh. Dx: ambulatory dysfunction, right knee OA, lumbar spine spondylosis, and neuropathy. order placed for PT eval and tx, w/ activity order of fall precautions and out of bed to chair. pt presents w/ comorbidities of low back and bilateral calf pain, PVD, venous stasis, left knee surgery, right shoulder surgery, and arthritis and personal factors of mobilizing w/ assistive device, stair(s) to enter home, and limited home support. pt presents w/ pain, weakness, decreased endurance, impaired balance, gait deviations, decreased safety awareness, and fall risk. these impairments are evident in findings from physical examination (weakness), mobility assessment (need for standby to min assist w/ all phases of mobility when usually mobilizing independently, tolerance to only 30 feet of ambulation, and need for cueing for mobility technique), and Barthel Index: 60/100. pt needed input for mobility technique and body mechanics. pt is at risk for falls due to physical and safety awareness deficits. pt's clinical presentation is unstable/unpredictable (evident in need for assist w/ all phases of mobility when usually mobilizing independently, tolerance to only 30 feet of  ambulation, pain impacting overall mobility status, and need for input for mobility technique/safety). pt needs inpatient PT tx to improve mobility deficits and progress mobility training as appropriate.        Rehab Resource Intensity Level, PT: III (Minimum Resource Intensity)    See flowsheet documentation for full assessment.

## 2025-02-07 NOTE — PLAN OF CARE
Problem: PAIN - ADULT  Goal: Verbalizes/displays adequate comfort level or baseline comfort level  Description: Interventions:  - Encourage patient to monitor pain and request assistance  - Assess pain using appropriate pain scale  - Administer analgesics based on type and severity of pain and evaluate response  - Implement non-pharmacological measures as appropriate and evaluate response  - Consider cultural and social influences on pain and pain management  - Notify physician/advanced practitioner if interventions unsuccessful or patient reports new pain  Outcome: Progressing     Problem: INFECTION - ADULT  Goal: Absence or prevention of progression during hospitalization  Description: INTERVENTIONS:  - Assess and monitor for signs and symptoms of infection  - Monitor lab/diagnostic results  - Monitor all insertion sites, i.e. indwelling lines, tubes, and drains  - Monitor endotracheal if appropriate and nasal secretions for changes in amount and color  - [place] appropriate cooling/warming therapies per order  - Administer medications as ordered  - Instruct and encourage patient and family to use good hand hygiene technique  - Identify and instruct in appropriate isolation precautions for identified infection/condition  Outcome: Progressing  Goal: Absence of fever/infection during neutropenic period  Description: INTERVENTIONS:  - Monitor WBC    Outcome: Progressing     Problem: SAFETY ADULT  Goal: Patient will remain free of falls  Description: INTERVENTIONS:  - Educate patient/family on patient safety including physical limitations  - Instruct patient to call for assistance with activity   - Consult OT/PT to assist with strengthening/mobility   - Keep Call bell within reach  - Keep bed low and locked with side rails adjusted as appropriate  - Keep care items and personal belongings within reach  - Initiate and maintain comfort rounds  - Make Fall Risk Sign visible to staff  - Offer Toileting every  Hours,  in advance of need  - Initiate/Maintain alarm  - Obtain necessary fall risk management equipment:   - Apply yellow socks and bracelet for high fall risk patients  - Consider moving patient to room near nurses station  Outcome: Progressing  Goal: Maintain or return to baseline ADL function  Description: INTERVENTIONS:  -  Assess patient's ability to carry out ADLs; assess patient's baseline for ADL function and identify physical deficits which impact ability to perform ADLs (bathing, care of mouth/teeth, toileting, grooming, dressing, etc.)  - Assess/evaluate cause of self-care deficits   - Assess range of motion  - Assess patient's mobility; develop plan if impaired  - Assess patient's need for assistive devices and provide as appropriate  - Encourage maximum independence but intervene and supervise when necessary  - Involve family in performance of ADLs  - Assess for home care needs following discharge   - Consider OT consult to assist with ADL evaluation and planning for discharge  - Provide patient education as appropriate  Outcome: Progressing  Goal: Maintains/Returns to pre admission functional level  Description: INTERVENTIONS:  - Perform AM-PAC 6 Click Basic Mobility/ Daily Activity assessment daily.  - Set and communicate daily mobility goal to care team and patient/family/caregiver.   - Collaborate with rehabilitation services on mobility goals if consulted  - Perform Range of Motion  times a day.  - Reposition patient every  hours.  - Dangle patient  times a day  - Stand patient  times a day  - Ambulate patient  times a day  - Out of bed to chair  times a day   - Out of bed for meals  times a day  - Out of bed for toileting  - Record patient progress and toleration of activity level   Outcome: Progressing     Problem: DISCHARGE PLANNING  Goal: Discharge to home or other facility with appropriate resources  Description: INTERVENTIONS:  - Identify barriers to discharge w/patient and caregiver  - Arrange for  needed discharge resources and transportation as appropriate  - Identify discharge learning needs (meds, wound care, etc.)  - Arrange for interpretive services to assist at discharge as needed  - Refer to Case Management Department for coordinating discharge planning if the patient needs post-hospital services based on physician/advanced practitioner order or complex needs related to functional status, cognitive ability, or social support system  Outcome: Progressing     Problem: Knowledge Deficit  Goal: Patient/family/caregiver demonstrates understanding of disease process, treatment plan, medications, and discharge instructions  Description: Complete learning assessment and assess knowledge base.  Interventions:  - Provide teaching at level of understanding  - Provide teaching via preferred learning methods  Outcome: Progressing

## 2025-02-07 NOTE — ASSESSMENT & PLAN NOTE
At baseline patient can walk using a cane    Presents post injury of right thigh while walking into work this morning  Patient had an injury to left leg 3 weeks ago, since then has been shifting most of her weight on the right leg  Not able to stand up and walk around independently without support.  Previous imaging outpatient X-ray showed degenerative disc disease from L1-L3, MRI spine lumbar spondylosis with herniation    Some improvement in pain level after receiving medications in the ED  On physical exam: right knee is swollen, patient cannot bend right knee, no saddle anesthesia, no motor weakness-patient can elevate legs, dorsiflex, and planterflex, palpable dorsalis pulses bilaterally, no pinpoint back or paraspinal tenderness, some reproducible pain of back of right thigh (there is a lidocaine patch there),   Labs unremarkable except for some leukocytosis likely reactive   Xray hip hip pelvis no acute fracture, Xray knee no acute fracture. Some improvement to pain medication in the ED     Plan  Pain medication regimen   oxycodone 2.5 mild and moderate pain   oxycodone 5 mg for severe pain   IV dilaudid for breakthrough pain    Lidocaine patch on right thigh  PT   OT   May need Walker on Discharge

## 2025-02-07 NOTE — DISCHARGE INSTR - AVS FIRST PAGE
Dear Guillermina Cleveland,     It was our pleasure to care for you here at [facility].  It is our hope that we were always able to exceed the expected standards for your care during your stay.  You were hospitalized due to right thigh pain.  You were cared for on the 1st floor by Jm Rios DO under the service of Betty Luu MD with the [facility] who covers for your primary care physician (PCP), Endy Padilla MD, while you were hospitalized.  If you have any questions or concerns related to this hospitalization, you may contact us at .  For follow up as well as any medication refills, we recommend that you follow up with your primary care physician.  A registered nurse will reach out to you by phone within a few days after your discharge to answer any additional questions that you may have after going home.  However, at this time we provide for you here, the most important instructions / recommendations at discharge:     Notable Medication Adjustments -   You may apply Lidocaine patch to right thigh for up to 12 hours in a 24 hour period.  You may take Robaxin 750 mg every 8 hours as needed for muscle spasm.  Start taking Protonix 40 mg daily. This medication helps to protect your stomach lining because you are taking Diclofenac.  Continue taking the rest of your medications as previously prescribed.  Testing Required after Discharge -   None  ** Please contact your PCP to request testing orders for any of the testing recommended here **  Important follow up information -   Follow-up with your primary care physician within 1 week.  Follow-up with your pain management specialist, Dr. Pisano.  Other Instructions -   Please return to the emergency department if you have chest pain, difficulty breathing, abdominal pain, nausea, vomiting, diarrhea, fevers, chills, weakness, inability to walk, or any other symptoms concerning to  you.  Please review this entire after visit summary as additional general instructions including medication list, appointments, activity, diet, any pertinent wound care, and other additional recommendations from your care team that may be provided for you.      Sincerely,     Jm Rios, DO

## 2025-02-07 NOTE — CASE MANAGEMENT
Case Management Assessment & Discharge Planning Note    Patient name Guillermina Cleveland  Location /-01 MRN [id number]  : 1955 Date 2025       Current Admission Date: 2025  Current Admission Diagnosis:Ambulatory dysfunction   Patient Active Problem List    Diagnosis Date Noted Date Diagnosed    Neuropathy 2025     Spondylosis with radiculopathy, lumbar region 2025     Ambulatory dysfunction 2025     Current mild episode of major depressive disorder without prior episode (HCC) 2024     Open-angle glaucoma of both eyes 2024     Venous stasis dermatitis of both lower extremities 2023     Varicose veins of left lower extremity with pain 2023     Primary osteoarthritis of right knee 2021       LOS (days): 0  Geometric Mean LOS (GMLOS) (days):   Days to GMLOS:     OBJECTIVE:              Current admission status: Observation       Preferred Pharmacy:   [facility] [address] [facility] [address] [address]  Phone: [phone] Fax: [phone]    [facility]/pharmacy #[id number] [address] [address]  Phone: [phone] Fax: [phone]    [facility] [address] [address]  Phone: [phone] Fax: [phone]    Primary Care Provider: Endy Padilla MD    Primary Insurance: MEDICARE  Secondary Insurance: AETNA    ASSESSMENT:  Active Health Care Proxies    There are no active Health Care Proxies on file.                 Readmission Root Cause  30 Day Readmission: No    Patient Information  Admitted from:: Home  Mental Status: Alert  During Assessment patient was accompanied by: Not accompanied during assessment  Assessment information provided by:: Patient  Primary Caregiver: Self  Support Systems: Self, Spouse/significant other  County of Residence: [place]  What city do you live in?:  [place]  Home entry access options. Select all that apply.: Stairs, Garage  Number of steps to enter home.: One Flight  Type of Current Residence: [facility]  Living Arrangements: Lives Alone    Activities of Daily Living Prior to Admission  Functional Status: Independent  Completes ADLs independently?: Yes  Ambulates independently?: Yes  Does patient use assisted devices?: Yes  Assisted Devices (DME) used: Straight Cane  Does patient have a history of Outpatient Therapy (PT/OT)?: Yes  Does the patient have a history of Short-Term Rehab?: No  Does patient have a history of HHC?: No  Does patient currently have HHC?: No    Patient Information Continued  Income Source: Pension/shelter  Does patient have prescription coverage?: Yes  Does patient receive dialysis treatments?: No  Does patient have a history of substance abuse?: No  Does patient have a history of Mental Health Diagnosis?: Yes  Is patient receiving treatment for mental health?: No. Patient declined treatment information.  Has patient received inpatient treatment related to mental health in the last 2 years?: No    Means of Transportation  Means of Transport to [facility]:: Drives Self    DISCHARGE DETAILS:    Discharge planning discussed with:: Patient  Freedom of Choice: Yes  Comments - Freedom of Choice: Home w/ OPPT  CM contacted family/caregiver?: No- see comments (Pt A&O)  Were Treatment Team discharge recommendations reviewed with patient/caregiver?: Yes  Did patient/caregiver verbalize understanding of patient care needs?: Yes  Were patient/caregiver advised of the risks associated with not following Treatment Team discharge recommendations?: Yes    Contacts  Patient Contacts: Patient  Contact Method: In Person  Reason/Outcome: Continuity of Care, Discharge Planning    Requested Home Health Care         Is the patient interested in HHC at discharge?: No    DME Referral Provided  Referral made for DME?: Yes  DME referral completed for the following items::  Jose F  DME Supplier Name:: [employer]    Other Referral/Resources/Interventions Provided:  Interventions: Other (Specify), DME  Referral Comments: CM spoke with pt at bedside, introduced self and role with dcp. Pt reported she lives alone in a ranch style home with one flight of stairs through the garage. Pt reported she was independent with ADLs and functional mobility. Pt reported she has a cane. Hx of OPPT. Pt reported preferred pharmacy is [facility] and PCP is Dr. Padilla. Pt reported driving self to appointments. OT rec RW, BSC and OP follow up. CM ordered RW and BSC via [employer], pending delivery. CM will continue to follow.    Would you like to participate in our [facility] service program?  : No - Declined    Treatment Team Recommendation: Home, Outpatient Rehab  Discharge Destination Plan:: Home, Outpatient Rehab

## 2025-02-07 NOTE — DISCHARGE SUMMARY
Discharge Summary - Hospitalist   Name: Guillermina Cleveland 69 y.o. female I MRN: [id number]  Unit/Bed#: -01 I Date of Admission: 2/6/2025   Date of Service: 2/7/2025 I Hospital Day: 0     Assessment & Plan  Ambulatory dysfunction  Secondary to right thigh pain  Twisted left ankle and her left knee while cleaning snow 3 weeks ago. Since then, she feels as if she had been bearing more weight on right leg  No red flag signs  Physical exam with 5/5 strength in BLE. Limited R hip flexion secondary to pain. Right leg with negative straight leg test  MRI L-spine (2/5) -multilevel spondylosis  XR right knee - no acute osseous abnormality.  Moderate tricompartmental osteoarthritis  XR right knee/pelvis - no acute osseous abnormality.  Left greater than right hip degenerative changes  Pain improved with Tylenol, Robaxin, a lidocaine patch, and 1 dose of oxycodone  Etiology is likely MSK related  PT/OT recommended level III    Plan:  Discharge home on Robaxin and lidocaine patches  Continue home diclofenac tablets  Follow-up with primary care physician  Primary osteoarthritis of right knee  Continue home Diclofenac 75 mg BID   Start Protonix 40 mg for ulcer prophylaxis  Spondylosis with radiculopathy, lumbar region  MRI L-spine (2/5) -multilevel spondylosis  Follow-up with pain medicine specialist, Dr. Pisano  Neuropathy  Was previously on gabapentin  Was weaned off gabapentin by pain management as outpatient PTA     Medical Problems       Resolved Problems  Date Reviewed: 12/11/2024          Resolved    Claudication of both lower extremities (HCC) 2/6/2025     Resolved by  Fabiola Cantrell MD    Liver cyst 2/6/2025     Resolved by  Fabiola Cantrell MD        Discharging Physician / Practitioner: Jm Rios DO  PCP: Endy Padilla MD  Admission Date:   Admission Orders (From admission, onward)       Ordered        02/06/25 1809  Place in Observation  Once                          Discharge Date: 02/07/25    Consultations  During Hospital Stay:  None    Procedures Performed:   None    Significant Findings / Test Results:   XR hip/pelv 2-3 vws right   Final Result by Mitch Tejeda MD (02/06 1722)      No acute osseous abnormality. Left greater than right hip degenerative change.         Computerized Assisted Algorithm (CAA) may have been used to analyze all applicable images.            Workstation performed: [id number]         XR knee 4+ views Right injury   ED Interpretation by Kathi Yeung DO (02/06 1636)   No tibial platue fracture visualized. Osteoarthritis to knee joint but otherwise unremarkable      Final Result by Maye Morrison MD (02/06 1642)      No acute osseous abnormality.      Moderate tricompartmental osteoarthritis.         Computerized Assisted Algorithm (CAA) may have been used to analyze all applicable images.         Workstation performed: [id number]           Incidental Findings:   None    Test Results Pending at Discharge (will require follow up):   None     Outpatient Tests Requested:  None    Complications:  None    Reason for Admission: Right thigh pain    Hospital Course:   Guillermina Cleveland is a 69 y.o. female patient who originally presented to the hospital on 2/6/2025 due to right thigh pain and ambulatory dysfunction.  She states that she is twisted her left ankle and hurt her left knee while cleaning snow 3 weeks ago.  Since then, she feels as if she had been bearing more weight on the right leg.  She was unable to ambulate or get up from a seated position, which prompted her to come to the emergency room.  Lab work was unremarkable.  Imaging showed tricompartmental right knee osteoarthritis.  X-ray of the pelvis showed left greater than right hip degenerative changes.  Pain was controlled with Tylenol, Robaxin, lidocaine patch, 1 dose of oxycodone in addition to home diclofenac.  Etiology is likely musculoskeletal in nature.  PT recommended level III.  Prescription for lidocaine patch  "was given.  Given that patient is taking diclofenac on a regular basis, will give prescription for Protonix.  Plan for outpatient follow-up with primary care physician and pain management specialist, Dr. Pisano.  Stable for discharge to home.    Please see above list of diagnoses and related plan for additional information.     Condition at Discharge: stable    Discharge Day Visit / Exam:   Subjective: Patient was seen and examined at bedside.  She reported having no pain at rest.  She would like to work with PT and see how her pain is.  She is hopeful that she can return home today.  She denies any chest pain, difficulty breathing, abdominal pain, nausea, vomiting, red flag signs.  Vitals: Blood Pressure: 123/73 (02/07/25 0700)  Pulse: 69 (02/06/25 2242)  Temperature: 97.7 °F (36.5 °C) (02/07/25 0700)  Temp Source: Oral (02/07/25 0700)  Respirations: 20 (02/07/25 0700)  Height: 5' 8\" (172.7 cm) (02/07/25 1500)  Weight - Scale: 80.3 kg (177 lb) (02/07/25 1500)  SpO2: 98 % (02/07/25 0831)  Physical Exam  Constitutional:       General: She is not in acute distress.     Appearance: Normal appearance. She is not ill-appearing.   HENT:      Head: Normocephalic and atraumatic.      Mouth/Throat:      Mouth: Mucous membranes are moist.   Eyes:      Extraocular Movements: Extraocular movements intact.   Cardiovascular:      Rate and Rhythm: Normal rate and regular rhythm.      Heart sounds: No murmur heard.  Pulmonary:      Effort: No respiratory distress.      Breath sounds: No wheezing, rhonchi or rales.   Abdominal:      General: Bowel sounds are normal. There is no distension.      Palpations: Abdomen is soft.      Tenderness: There is no abdominal tenderness. There is no guarding or rebound.   Musculoskeletal:         General: No swelling or tenderness. Normal range of motion.      Cervical back: Normal range of motion. No tenderness.      Right lower leg: No edema.      Comments: 5/5 strength in BLE. Limited R hip " flexion secondary to pain. Right leg with negative straight leg test     Skin:     General: Skin is warm.      Capillary Refill: Capillary refill takes less than 2 seconds.   Neurological:      General: No focal deficit present.      Mental Status: She is alert and oriented to person, place, and time. Mental status is at baseline.        Discussion with Family:  patient to update family.     Discharge instructions/Information to patient and family:   See after visit summary for information provided to patient and family.      Provisions for Follow-Up Care:  See after visit summary for information related to follow-up care and any pertinent home health orders.      Mobility at time of Discharge:   Basic Mobility Inpatient Raw Score: 19  JH-HLM Goal: 6: Walk 10 steps or more  JH-HLM Achieved: 7: Walk 25 feet or more  HLM Goal achieved. Continue to encourage appropriate mobility.     Disposition:   Home    Planned Readmission: No    Discharge Medications:  See after visit summary for reconciled discharge medications provided to patient and/or family.      Administrative Statements   Discharge Statement:  I have spent a total time of 30 minutes in caring for this patient on the day of the visit/encounter. .    **Please Note: This note may have been constructed using a voice recognition system**

## 2025-02-07 NOTE — PLAN OF CARE
Problem: OCCUPATIONAL THERAPY ADULT  Goal: Performs self-care activities at highest level of function for planned discharge setting.  See evaluation for individualized goals.  Description: Treatment Interventions: ADL retraining, Functional transfer training, Endurance training, Equipment evaluation/education, Patient/family training, Energy conservation, Activityengagement  Equipment Recommended: Bedside commode (recommend use of RW at this time)       See flowsheet documentation for full assessment, interventions and recommendations.   Note: Limitation: Decreased ADL status, Decreased endurance, Decreased self-care trans, Decreased high-level ADLs (impaired pain, standing tolerance)     Assessment: Pt is a 70yo female [facility] on [facility]/25 w/ R LE pain. Diagnosed w/ ambulatory dysfunction and imaging negative for acute fracture. Significant PMH Impacting her occupational performance includes neuropathy, depression, osteoarthritis R knee, lumbar spondylosis w/ radiculopathy. Pt reports living alone in 1 SH w/ flight of stairs to enter. Pt reports I w/ ADL/ IADL at true baseline w/ out use of AD. Pt reports use of cane prior to admission due to recent L LE injury. Upon eval, pt alert and oriented. Able to follow directions and participate in conversation. Pt required S grooming, I UBD, min A LBD, S toileting, S bed mobility, min A progressing to S sit to stand and S using RW for functional mobility. Pt is completing ADLs below baseline level of I and would benefit from OT in acute care to max I w/ ADLs, achieve highest level of function. Recommend no post acute OT rehab needs when medically stable for discharge but would benefit from PT, use of RW and commode. Will continue to follow in acute care.     Rehab Resource Intensity Level, OT: No post-acute rehabilitation needs

## 2025-02-07 NOTE — PLAN OF CARE
Problem: PAIN - ADULT  Goal: Verbalizes/displays adequate comfort level or baseline comfort level  Description: Interventions:  - Encourage patient to monitor pain and request assistance  - Assess pain using appropriate pain scale  - Administer analgesics based on type and severity of pain and evaluate response  - Implement non-pharmacological measures as appropriate and evaluate response  - Consider cultural and social influences on pain and pain management  - Notify physician/advanced practitioner if interventions unsuccessful or patient reports new pain  Outcome: Progressing     Problem: INFECTION - ADULT  Goal: Absence or prevention of progression during hospitalization  Description: INTERVENTIONS:  - Assess and monitor for signs and symptoms of infection  - Monitor lab/diagnostic results  - Monitor all insertion sites, i.e. indwelling lines, tubes, and drains  - Monitor endotracheal if appropriate and nasal secretions for changes in amount and color  - [place] appropriate cooling/warming therapies per order  - Administer medications as ordered  - Instruct and encourage patient and family to use good hand hygiene technique  - Identify and instruct in appropriate isolation precautions for identified infection/condition  Outcome: Progressing  Goal: Absence of fever/infection during neutropenic period  Description: INTERVENTIONS:  - Monitor WBC    Outcome: Progressing     Problem: SAFETY ADULT  Goal: Patient will remain free of falls  Description: INTERVENTIONS:  - Educate patient/family on patient safety including physical limitations  - Instruct patient to call for assistance with activity   - Consult OT/PT to assist with strengthening/mobility   - Keep Call bell within reach  - Keep bed low and locked with side rails adjusted as appropriate  - Keep care items and personal belongings within reach  - Initiate and maintain comfort rounds  - Make Fall Risk Sign visible to staff  - Offer Toileting every 2 Hours,  in advance of need  - Apply yellow socks and bracelet for high fall risk patients  - Consider moving patient to room near nurses station  Outcome: Progressing  Goal: Maintain or return to baseline ADL function  Description: INTERVENTIONS:  -  Assess patient's ability to carry out ADLs; assess patient's baseline for ADL function and identify physical deficits which impact ability to perform ADLs (bathing, care of mouth/teeth, toileting, grooming, dressing, etc.)  - Assess/evaluate cause of self-care deficits   - Assess range of motion  - Assess patient's mobility; develop plan if impaired  - Assess patient's need for assistive devices and provide as appropriate  - Encourage maximum independence but intervene and supervise when necessary  - Involve family in performance of ADLs  - Assess for home care needs following discharge   - Consider OT consult to assist with ADL evaluation and planning for discharge  - Provide patient education as appropriate  Outcome: Progressing  Goal: Maintains/Returns to pre admission functional level  Description: INTERVENTIONS:  - Perform AM-PAC 6 Click Basic Mobility/ Daily Activity assessment daily.  - Set and communicate daily mobility goal to care team and patient/family/caregiver.   - Collaborate with rehabilitation services on mobility goals if consulted  - Perform Range of Motion 3 times a day.  - Reposition patient every 2 hours.  - Dangle patient 3 times a day  - Stand patient 3 times a day  - Ambulate patient 3 times a day  - Out of bed to chair 3 times a day   - Out of bed for meals 3 times a day  - Out of bed for toileting  - Record patient progress and toleration of activity level   Outcome: Progressing     Problem: DISCHARGE PLANNING  Goal: Discharge to home or other facility with appropriate resources  Description: INTERVENTIONS:  - Identify barriers to discharge w/patient and caregiver  - Arrange for needed discharge resources and transportation as appropriate  - Identify  discharge learning needs (meds, wound care, etc.)  - Arrange for interpretive services to assist at discharge as needed  - Refer to Case Management Department for coordinating discharge planning if the patient needs post-hospital services based on physician/advanced practitioner order or complex needs related to functional status, cognitive ability, or social support system  Outcome: Progressing     Problem: Knowledge Deficit  Goal: Patient/family/caregiver demonstrates understanding of disease process, treatment plan, medications, and discharge instructions  Description: Complete learning assessment and assess knowledge base.  Interventions:  - Provide teaching at level of understanding  - Provide teaching via preferred learning methods  Outcome: Progressing

## 2025-02-10 ENCOUNTER — TRANSITIONAL CARE MANAGEMENT (OUTPATIENT)
Dept: FAMILY MEDICINE CLINIC | Facility: CLINIC | Age: 70
End: 2025-02-10

## 2025-02-10 ENCOUNTER — TELEPHONE (OUTPATIENT)
Age: 70
End: 2025-02-10

## 2025-02-10 ENCOUNTER — EVALUATION (OUTPATIENT)
Dept: PHYSICAL THERAPY | Facility: REHABILITATION | Age: 70
End: 2025-02-10
Payer: MEDICARE

## 2025-02-10 DIAGNOSIS — R26.2 AMBULATORY DYSFUNCTION: Primary | ICD-10-CM

## 2025-02-10 DIAGNOSIS — R26.89 BALANCE DISORDER: ICD-10-CM

## 2025-02-10 PROCEDURE — 97112 NEUROMUSCULAR REEDUCATION: CPT

## 2025-02-10 PROCEDURE — 97161 PT EVAL LOW COMPLEX 20 MIN: CPT

## 2025-02-10 NOTE — PROGRESS NOTES
PT Evaluation     Today's date: 2/10/2025  Patient name: Guillermina Cleveland  : 1955  MRN: [id number]  Referring provider: Jm Rios DO  Dx:   Encounter Diagnosis     ICD-10-CM    1. Ambulatory dysfunction  R26.2 Ambulatory Referral to Physical Therapy      2. Balance disorder  R26.89           Start Time: 1615  Stop Time: 1700  Total time in clinic (min): 45 minutes    Assessment  Impairments: abnormal gait, abnormal muscle tone, abnormal or restricted ROM, abnormal movement, activity intolerance, impaired balance, impaired physical strength, lacks appropriate home exercise program, pain with function, weight-bearing intolerance, poor posture , poor body mechanics and unable to perform ADL  Symptom irritability: low    Assessment details: Patient presents to PT with balance and gait dysfunction limiting pt activity tolerance. Postural assessment reveals forward trunk flexion resulting in impaired core and glute activation. Decreased hip and BLE musculature strength results in poor unilateral stability and impaired squatting mechanics. Pt is at an increased risk for falls 2/2 5x STS, TUG, and balance measurements. Functional limitations include standing, transfers, walking, stair negotiation, and ADLs. Provided updated copy of HEP to aide with compliance with good understanding. Patient would benefit from skilled PT interventions to address above mentioned impairments, activity limitations, and participation restrictions to reduce risk for injury and return to prior activity tolerance.     Understanding of Dx/Px/POC: fair     Prognosis: fair    Goals  STG (in 3 weeks)  1. Independent with HEP to facilitate return to PLOF  2. <2/10 pain with >30 min walking from gains in positional tolerance  3. <12 ec on 5x STS to reduce risk for falls  LTG (in 8 weeks)  1. SLS >30 sec to maximize balance on uneven surfaces  2. Increase BLE MMT >4+/5 in all planes to improve unilateral stability  3. Increase FOTO score to  goal to facilitate return to PLOF    Plan  Patient would benefit from: skilled physical therapy  Planned modality interventions: cryotherapy    Planned therapy interventions: abdominal trunk stabilization, ADL training, activity modification, balance, IASTM, joint mobilization, manual therapy, balance/weight bearing training, body mechanics training, coordination, dressing changes, flexibility, functional ROM exercises, gait training, home exercise program, neuromuscular re-education, patient education, postural training, strengthening, stretching, therapeutic activities, therapeutic exercise and transfer training    Frequency: 2x week  Duration in weeks: 8  Plan of Care beginning date: 2/10/2025  Plan of Care expiration date: 4/7/2025  Treatment plan discussed with: patient  Plan details: Thank you for referring Guillermina Cleveland to PT at [facility] and for the opportunity to coordinate care.          Subjective  Last week, patient began experiencing R knee pain with no specific GUERITA. Patient went to ED and underwent MRI lumbar spine which revealed multilevel spondylosis and hip/knee XR which revealed OA. Occasional pain is reported as aching around posterior calf and currently rated 0/10, pt reports exacerbation when standing. Patient denies any numbness/tingling or radiating pain. Pain improves to 0/10 with Oxycodone. Pain increases to 8/10 with STS transfers, standing (<5 min), walking (counter surfing with household ambulation; RW with community ambulation), and stairs (step to pattern). Pt denies any falls or knee buckling. Patient has increased difficulty with ADLs such as driving, cooking, cleaning, lifting, and bending down. Patient is retired. Prior to dysfunction, patient was walking 1-2 miles which she now has difficulty with. Patient goals for PT are to reduce pain and return to prior walking tolerance without AD.        Objective    Posture: forward trunk flexion, WBOS, increased b/l knee flexion   5x  STS: 3 STS  - 33 sec with BUE assistance  Balance:    DL eyes open: 30 sec  DL eyes closed: 30 sec  Tandem stance: R 20 sec L 13 sec   SLS: R 6 sec L 4 sec  TU sec with RW  LE MMT:   RLE: grossly >3+/5 in all planes  LLE: grossly >3/5 in all planes       Precautions: not significant  *HEP:    Manuals 2/10                 STM                  IAS                                                           Neuro Re-Ed                   Pt education Prognosis, diagnosis, HEP                 Bridges nv                 Clamshells  nv          SLS nv                 Tandem stance nv          SLR   nv                 Sciatic nerve glides nv                  Ther Ex                   Calf stretch (seated)_ nv          LAQ                  HS curls                  Bike  nv                 Standing marches  nv                 Tib raises on wall  nv                 Standing HR                   Ther Activity                   Squatting  nv                 Step ups (FW)                   Step ups (LAT)                   Standing hip abd & ext  nv                 Side steps                   Modalities                   cryotherapy

## 2025-02-10 NOTE — TELEPHONE ENCOUNTER
Pt called for lumbar spine MRI results and next step. Pt also seen in ED 2/6/25 pls review ED note and imaging from ED visit.

## 2025-02-10 NOTE — TELEPHONE ENCOUNTER
Pt informed of lumbar MRI results per task and FQ rec for B/L L4 TFESI. Pt is interested in doing inj.   Pt denies taking blood thinner and is not diabetic.  Told pt our  will call her.     Pt will also f/u w/ PCP about cyst on liver.

## 2025-02-10 NOTE — TELEPHONE ENCOUNTER
Caller: PT    Doctor: Dr. DAMON    Reason for call: Pt was seen in ED for severe leg pain over the weekend. PT has not had received call for MRI results and would like to know them. Pt wants to know next steps and if injection will be an option.    Please assist.    Call back#: [phone]

## 2025-02-10 NOTE — TELEPHONE ENCOUNTER
Please let patient know I reviewed her MRI lumbar spine results as well as her recent ED visit notes.  She has multilevel arthritis and disc bulging at every level of the lumbar spine with some mild areas of canal stenosis.  Would recommend proceeding with bilateral L4 transforaminal epidural injection to help reduce swelling/inflammation from the arthritis.  If amenable, please send to scheduling team.    They also made incidental note of a cyst in her liver for which she will need to follow back up with her PCP

## 2025-02-10 NOTE — TELEPHONE ENCOUNTER
Patient has been scheduled for their procedure, please see [employer] message for additional details.

## 2025-02-12 ENCOUNTER — OFFICE VISIT (OUTPATIENT)
Dept: FAMILY MEDICINE CLINIC | Facility: CLINIC | Age: 70
End: 2025-02-12

## 2025-02-12 ENCOUNTER — TELEPHONE (OUTPATIENT)
Age: 70
End: 2025-02-12

## 2025-02-12 VITALS
RESPIRATION RATE: 16 BRPM | BODY MASS INDEX: 26.37 KG/M2 | HEART RATE: 67 BPM | OXYGEN SATURATION: 100 % | DIASTOLIC BLOOD PRESSURE: 80 MMHG | WEIGHT: 174 LBS | HEIGHT: 68 IN | TEMPERATURE: 96.7 F | SYSTOLIC BLOOD PRESSURE: 110 MMHG

## 2025-02-12 DIAGNOSIS — R26.2 AMBULATORY DYSFUNCTION: ICD-10-CM

## 2025-02-12 DIAGNOSIS — M51.361 DEGENERATION OF INTERVERTEBRAL DISC OF LUMBAR REGION WITH LOWER EXTREMITY PAIN: ICD-10-CM

## 2025-02-12 DIAGNOSIS — Z09 HOSPITAL DISCHARGE FOLLOW-UP: Primary | ICD-10-CM

## 2025-02-12 DIAGNOSIS — M79.651 RIGHT THIGH PAIN: ICD-10-CM

## 2025-02-12 DIAGNOSIS — M17.11 PRIMARY OSTEOARTHRITIS OF RIGHT KNEE: ICD-10-CM

## 2025-02-12 DIAGNOSIS — M51.361 DEGENERATION OF INTERVERTEBRAL DISC OF LUMBAR REGION WITH LOWER EXTREMITY PAIN: Primary | ICD-10-CM

## 2025-02-12 RX ORDER — METHOCARBAMOL 750 MG/1
750 TABLET, FILM COATED ORAL EVERY 8 HOURS PRN
Qty: 30 TABLET | Refills: 0 | Status: SHIPPED | OUTPATIENT
Start: 2025-02-12

## 2025-02-12 NOTE — TELEPHONE ENCOUNTER
Keenan middleton [facility] called in and is requesting a Script for Physical Therapy, Please fax to [phone].

## 2025-02-12 NOTE — PROGRESS NOTES
Transition of Care Visit  Name: Guillermina Cleveland      : 1955      MRN: [id number]  Encounter Provider: Endy Padilla MD  Encounter Date: 2025   Encounter department: [facility]    Assessment & Plan  Hospital discharge follow-up         Degeneration of intervertebral disc of lumbar region with lower extremity pain  Scheduled to have B/L L4 LUIGI  with Dr. Browne        Ambulatory dysfunction  Secondary to intractable knee and calf pain. Feeling better on robaxin, voltaren, and lidocaine patches. Continue PT. Plans to switch to [facility]. Also scheduled to get an injection in the back with pain management later this month        Right thigh pain  Resolved. Robaxin refilled   Orders:    methocarbamol (ROBAXIN) 750 mg tablet; Take 1 tablet (750 mg total) by mouth every 8 (eight) hours as needed for muscle spasms         History of Present Illness     Transitional Care Management Review:   Guillermina Cleveland is a 69 y.o. female here for TCM follow up.     During the TCM phone call patient stated:  TCM Call       Date and time call was made  2/10/2025 10:41 AM    Patient was hospitialized at  [facility]    Date of Admission  25    Date of discharge  25    Diagnosis  Ambulatory dysfunction    Disposition  Home    Were the patients medications reviewed and updated  No          TCM Call       Should patient be enrolled in anticoag monitoring?  No    Scheduled for follow up?  Yes    Did you obtain your prescribed medications  Yes    Do you need help managing your prescriptions or medications  No    Is transportation to your appointment needed  No    I have advised the patient to call PCP with any new or worsening symptoms  MR Mary    Living Arrangements  Alone    Are you recieving any outpatient services  No    Are you recieving home care services  No    Are you using any community resources  No    Current waiver services  No    Have you fallen in the last  "12 months  No    Interperter language line needed  No          Seen today for TCM   States she felt better after we met. She was doing PT with [facility].   Suddenly once day while walking at work, she developed pain in the back of the right knee and severe right calf pain where she couldn't walk   No falls or injuries. She ended up going to the ED due to intractable pain and ambulatory dysfunction.  Pain improved with pain regimen and PT. She was discharged with muscle relaxer, voltaren, and lidocaine patches. She is now able to walk with the assistance of a cane. In the hospital she was using the walker but not anymore  Started PT with [facility] yesterday but plans to switch to [facility]   Pain is at a 0. Hurts if she stands too long.   Can now walk with and without the cane. Taking robaxin every 8 hours and doesn't feel tired afterwards.   Still using lidocaine patches and voltaten twice daily with the Protonix prescribed in the hospital   Saw ortho and and suggested injection in the knees  Getting back injections with Dr. Pisano next week and ortho suggested she wait till afterwards      Review of Systems   Musculoskeletal:  Positive for arthralgias (improved) and gait problem (transitioned from walker to cane).     Objective   /80 (BP Location: Left arm, Patient Position: Sitting, Cuff Size: Large)   Pulse 67   Temp (!) 96.7 °F (35.9 °C) (Tympanic)   Resp 16   Ht 5' 8\" (1.727 m)   Wt 78.9 kg (174 lb)   SpO2 100%   BMI 26.46 kg/m²     Physical Exam  Vitals reviewed.   Constitutional:       General: She is not in acute distress.     Appearance: Normal appearance. She is not ill-appearing or toxic-appearing.   HENT:      Head: Normocephalic and atraumatic.   Eyes:      Extraocular Movements: Extraocular movements intact.   Pulmonary:      Effort: Pulmonary effort is normal.   Musculoskeletal:      Right hip: No deformity, lacerations, tenderness or bony tenderness. Normal range of motion.      Left hip: No " deformity, lacerations, tenderness or bony tenderness. Normal range of motion.      Right knee: No effusion, bony tenderness or crepitus. Normal range of motion. No tenderness.      Left knee: No effusion, bony tenderness or crepitus. Normal range of motion. No tenderness.   Neurological:      Mental Status: She is alert and oriented to person, place, and time.      Gait: Gait abnormal (cane).   Psychiatric:         Mood and Affect: Mood normal.         Behavior: Behavior normal.         Thought Content: Thought content normal.       Medications have been reviewed by provider in current encounter

## 2025-02-13 NOTE — ASSESSMENT & PLAN NOTE
Secondary to intractable knee and calf pain. Feeling better on robaxin, voltaren, and lidocaine patches. Continue PT. Plans to switch to [facility]. Also scheduled to get an injection in the back with pain management later this month

## 2025-02-17 ENCOUNTER — APPOINTMENT (OUTPATIENT)
Dept: PHYSICAL THERAPY | Facility: REHABILITATION | Age: 70
End: 2025-02-17
Payer: MEDICARE

## 2025-02-18 DIAGNOSIS — M51.16 INTERVERTEBRAL DISC DISORDER WITH RADICULOPATHY OF LUMBAR REGION: ICD-10-CM

## 2025-02-18 NOTE — TELEPHONE ENCOUNTER
[facility] Provided with [facility]# and [place].    Pt requested diclofenac refill.  Routine questions.    How often is pt taking this medication?  Is this medication helping for your pain?  How many tablets do you have left?

## 2025-02-18 NOTE — TELEPHONE ENCOUNTER
Reason for call:   [x] Refill   [] Prior Auth  [] Other:     Office:   [] PCP/Provider -   [x] Specialty/Provider - Spine and Pain    Medication: diclofenac (VOLTAREN) 75 mg     Dose/Frequency: Take 1 tablet (75 mg total) by mouth 2 (two) times a day as needed     Quantity: 30    Pharmacy: [place] [id number] - [facility] - [address]     Does the patient have enough for 3 days?   [x] Yes   [] No - Send as HP to POD

## 2025-02-18 NOTE — PROGRESS NOTES
PT Discharge    Today's date: 2025  Patient name: Guillermina Cleveland  : 1955  MRN: [id number]  Referring provider: Jm Rios DO  Dx:   Encounter Diagnosis     ICD-10-CM    1. Ambulatory dysfunction  R26.2 Ambulatory Referral to Physical Therapy     PT plan of care cert/re-cert      2. Balance disorder  R26.89 PT plan of care cert/re-cert          Assessment/Plan    Pt was present for IE only. Guillermina Cleveland has not returned since last appointment, unable to perform objective measures since then.  Per dept policy of not having inactive charts , Guillermina Cleveland will be discharged from physical therapy services.

## 2025-02-20 ENCOUNTER — HOSPITAL ENCOUNTER (OUTPATIENT)
Dept: RADIOLOGY | Facility: CLINIC | Age: 70
End: 2025-02-20
Payer: MEDICARE

## 2025-02-20 VITALS
OXYGEN SATURATION: 96 % | DIASTOLIC BLOOD PRESSURE: 91 MMHG | RESPIRATION RATE: 18 BRPM | TEMPERATURE: 98.4 F | SYSTOLIC BLOOD PRESSURE: 132 MMHG | HEART RATE: 69 BPM

## 2025-02-20 DIAGNOSIS — M51.16 INTERVERTEBRAL DISC DISORDER WITH RADICULOPATHY OF LUMBAR REGION: ICD-10-CM

## 2025-02-20 PROCEDURE — 64483 NJX AA&/STRD TFRM EPI L/S 1: CPT | Performed by: ANESTHESIOLOGY

## 2025-02-20 RX ORDER — DICLOFENAC SODIUM 75 MG/1
75 TABLET, DELAYED RELEASE ORAL 2 TIMES DAILY PRN
Qty: 30 TABLET | Refills: 5 | Status: SHIPPED | OUTPATIENT
Start: 2025-02-20

## 2025-02-20 RX ORDER — 0.9 % SODIUM CHLORIDE 0.9 %
4 VIAL (ML) INJECTION ONCE
Status: COMPLETED | OUTPATIENT
Start: 2025-02-20 | End: 2025-02-20

## 2025-02-20 RX ORDER — METHYLPREDNISOLONE ACETATE 80 MG/ML
80 INJECTION, SUSPENSION INTRA-ARTICULAR; INTRALESIONAL; INTRAMUSCULAR; PARENTERAL; SOFT TISSUE ONCE
Status: COMPLETED | OUTPATIENT
Start: 2025-02-20 | End: 2025-02-20

## 2025-02-20 RX ORDER — BUPIVACAINE HCL/PF 2.5 MG/ML
2 VIAL (ML) INJECTION ONCE
Status: COMPLETED | OUTPATIENT
Start: 2025-02-20 | End: 2025-02-20

## 2025-02-20 RX ADMIN — METHYLPREDNISOLONE ACETATE 80 MG: 80 INJECTION, SUSPENSION INTRA-ARTICULAR; INTRALESIONAL; INTRAMUSCULAR; SOFT TISSUE at 16:27

## 2025-02-20 RX ADMIN — LIDOCAINE HYDROCHLORIDE 4 ML: 20 INJECTION, SOLUTION EPIDURAL; INFILTRATION; INTRACAUDAL at 16:25

## 2025-02-20 RX ADMIN — IOHEXOL 1 ML: 300 INJECTION, SOLUTION INTRAVENOUS at 16:27

## 2025-02-20 RX ADMIN — BUPIVACAINE HYDROCHLORIDE 2 ML: 2.5 INJECTION, SOLUTION EPIDURAL; INFILTRATION; INTRACAUDAL at 16:27

## 2025-02-20 RX ADMIN — Medication 4 ML: at 16:25

## 2025-02-20 NOTE — TELEPHONE ENCOUNTER
RN s/w pt and she said the diclofenac is helping, she has a couple pills left. She uses it as needed, mainly takes 1-2 hrs before she is going to get OOB and then it helps with her mobility.   Pls send refill to [facility] on file.    Pt said she has a procedure this afternoon, B/L lumbar TFESI. Asked if she was getting anesthesia , wondered why she needed a . Told pt no anesthesia just local numbing to the inj site. Told pt her legs could be a little weak/wobbly from the numbing medication for a few hrs afterwards. Pt appreciated the explanation.

## 2025-02-20 NOTE — H&P
History of Present Illness: The patient is a 69 y.o. female who presents with complaints of lower back pain is here today for bilateral L4 transforaminal epidural steroid injection    No past medical history on file.    Past Surgical History:   Procedure Laterality Date    KNEE SURGERY Left     SHOULDER SURGERY Right          Current Outpatient Medications:     diclofenac (VOLTAREN) 75 mg EC tablet, Take 1 tablet (75 mg total) by mouth 2 (two) times a day as needed (pain), Disp: 30 tablet, Rfl: 5    lidocaine (LIDODERM) 5 %, Apply 1 patch topically over 12 hours daily Remove & Discard patch within 12 hours or as directed by MD, Disp: 14 patch, Rfl: 0    methocarbamol (ROBAXIN) 750 mg tablet, Take 1 tablet (750 mg total) by mouth every 8 (eight) hours as needed for muscle spasms, Disp: 30 tablet, Rfl: 0    pantoprazole (PROTONIX) 40 mg tablet, Take 1 tablet (40 mg total) by mouth daily in the early morning, Disp: 30 tablet, Rfl: 0    timolol (TIMOPTIC) 0.5 % ophthalmic solution, , Disp: , Rfl:     Triamcinolone Acetonide (Nasacort Allergy) 55 MCG/ACT nasal spray, 1 spray by Each Nare route daily, Disp: , Rfl:     Current Facility-Administered Medications:     bupivacaine (PF) (MARCAINE) 0.25 % injection 2 mL, 2 mL, Epidural, Once, Fan Pisano MD    iohexol (OMNIPAQUE) 300 mg/mL injection 1 mL, 1 mL, Epidural, Once, Fan Pisano MD    lidocaine (PF) (XYLOCAINE-MPF) 2 % injection 4 mL, 4 mL, Infiltration, Once, Fan Pisano MD    methylPREDNISolone acetate (DEPO-MEDROL) injection 80 mg, 80 mg, Epidural, Once, Fan Pisano MD    sodium chloride (PF) 0.9 % injection 4 mL, 4 mL, Infiltration, Once, Fan Pisano MD    Allergies   Allergen Reactions    Sulfa Antibiotics        Physical Exam:   Vitals:    02/20/25 1608   BP: 124/84   Pulse: 74   Resp: 20   Temp: 98.4 °F (36.9 °C)   SpO2: 98%     General: Awake, Alert, Oriented x 3, Mood and affect appropriate  Respiratory: Respirations even and  unlabored  Cardiovascular: Peripheral pulses intact; no edema  Musculoskeletal Exam: Lower back pain    ASA Score: 3    Patient/Chart Verification  Patient ID Verified: Verbal  ID Band Applied: No  Consents Confirmed: To be obtained in the Procedural area  Interval H&P(within 24 hr) Complete (required for Outpatients and Surgery Admit only): To be obtained in the Procedural area  Allergies Reviewed: Yes  Anticoag/NSAID held?: NA  Currently on antibiotics?: No    Assessment:   1. Intervertebral disc disorder with radiculopathy of lumbar region        Plan: B/L L4 TFESI

## 2025-02-20 NOTE — DISCHARGE INSTR - LAB
Epidural Steroid Injection   WHAT YOU NEED TO KNOW:   An epidural steroid injection (LUIGI) is a procedure to inject steroid medicine into the epidural space. The epidural space is between your spinal cord and vertebrae. Steroids reduce inflammation and fluid buildup in your spine that may be causing pain. You may be given pain medicine along with the steroids.          ACTIVITY  Do not drive or operate machinery today.  No strenuous activity today - bending, lifting, etc.  You may resume normal activites starting tomorrow - start slowly and as tolerated.  You may shower today, but no tub baths or hot tubs.  You may have numbness for several hours from the local anesthetic. Please use caution and common sense, especially with weight-bearing activities.    CARE OF THE INJECTION SITE  If you have soreness or pain, apply ice to the area today (20 minutes on/20 minutes off).  Starting tomorrow, you may use warm, moist heat or ice if needed.  You may have an increase or change in your discomfort for 36-48 hours after your treatment.  Apply ice and continue with any pain medication you have been prescribed.  Notify the [facility] if you have any of the following: redness, drainage, swelling, headache, stiff neck or fever above 100°F.    SPECIAL INSTRUCTIONS  Our office will contact you in approximately 14 days for a progress report.    MEDICATIONS  Continue to take all routine medications.  Our office may have instructed you to hold some medications.    As no general anesthesia was used in today's procedure, you should not experience any side effects related to anesthesia.     If you are diabetic, the steroids used in today's injection may temporarily increase your blood sugar levels after the first few days after your injection. Please keep a close eye on your sugars and alert the doctor who manages your diabetes if your sugars are significantly high from your baseline or you are symptomatic.     If you have a  problem specifically related to your procedure, please call our office at [phone].  Problems not related to your procedure should be directed to your primary care physician.

## 2025-02-24 ENCOUNTER — APPOINTMENT (OUTPATIENT)
Dept: PHYSICAL THERAPY | Facility: REHABILITATION | Age: 70
End: 2025-02-24
Payer: MEDICARE

## 2025-03-05 DIAGNOSIS — M79.651 RIGHT THIGH PAIN: ICD-10-CM

## 2025-03-05 DIAGNOSIS — Z79.1 NSAID LONG-TERM USE: ICD-10-CM

## 2025-03-05 NOTE — TELEPHONE ENCOUNTER
Reason for call:   [x] Refill   [] Prior Auth  [] Other:     Office:   [x] PCP/Provider -   [] Specialty/Provider -     Medication:   pantoprazole (PROTONIX) 40 mg       Dose/Frequency: Take 1 tablet (40 mg total) by mouth daily in the early morning     Quantity: 30    Pharmacy: [address]     Medication: methocarbamol (ROBAXIN) 750 mg    Dose/Frequency: Take 1 tablet (750 mg total) by mouth every 8 (eight) hours as needed for muscle spasms     Quantity: 30    Pharmacy: [facility] [address]     Does the patient have enough for 3 days?   [x] Yes   [] No - Send as HP to POD

## 2025-03-06 ENCOUNTER — TELEPHONE (OUTPATIENT)
Age: 70
End: 2025-03-06

## 2025-03-06 ENCOUNTER — TELEPHONE (OUTPATIENT)
Dept: RADIOLOGY | Facility: MEDICAL CENTER | Age: 70
End: 2025-03-06

## 2025-03-06 DIAGNOSIS — M79.651 RIGHT THIGH PAIN: ICD-10-CM

## 2025-03-06 RX ORDER — METHOCARBAMOL 750 MG/1
750 TABLET, FILM COATED ORAL EVERY 8 HOURS PRN
Qty: 90 TABLET | Refills: 2 | Status: SHIPPED | OUTPATIENT
Start: 2025-03-06

## 2025-03-06 RX ORDER — PANTOPRAZOLE SODIUM 40 MG/1
40 TABLET, DELAYED RELEASE ORAL
Qty: 30 TABLET | Refills: 5 | Status: SHIPPED | OUTPATIENT
Start: 2025-03-06 | End: 2025-04-05

## 2025-03-06 RX ORDER — METHOCARBAMOL 750 MG/1
750 TABLET, FILM COATED ORAL EVERY 8 HOURS PRN
Qty: 30 TABLET | Refills: 0 | OUTPATIENT
Start: 2025-03-06

## 2025-03-06 NOTE — TELEPHONE ENCOUNTER
RN s/w pt who is s/p B/L L4 TFESI from 2/20/25. She reports ongoing pain of her legs, behind the knees and the calves. Sunday and Monday it was both legs and today it is only the Rt leg. Pt feels pain is worse than before inj.  Pt said it feels like muscle cramps that won't stop and it only occurs with walking and standing, not when she is lying or sitting. She tries doing stretches but that isn't helping.   She takes the methocarbamol Q am before going to work b/c b/c that's when she is walking and standing the most.  She currently was only taking the diclofenac once a day so RN advised pt to try taking it also at dinnertime.      Told pt I will forward her update to  and we will c/b with further recommendations.

## 2025-03-06 NOTE — TELEPHONE ENCOUNTER
Caller: Pt    Doctor: Dr. DAMON    Reason for call: Pt states she has no improvement since injection. Pt states she feels like she has gotten worse. Sent message to  regarding post procedure pain answers as well.     Pt wants to know if there are any other suggestions for relief.     Please assist.     Call back#: [phone]

## 2025-03-06 NOTE — TELEPHONE ENCOUNTER
She does need to give the injection more time.  Reviewed images and medicine went in very nicely where it needed to go.  She should increase the methocarbamol to take it 3 times a day

## 2025-03-06 NOTE — TELEPHONE ENCOUNTER
Caller: Pt  Doctor/office: Dr DAMON  CB#: [phone]     % of improvement: 0%  Pain Scale (1-10): 6-7      Pt states she think pain has gotten worse.

## 2025-03-06 NOTE — TELEPHONE ENCOUNTER
Pt advised of the the same.  Pt asking if we can send a Rx for the methocarbamol 750 mg to her [facility], she only has 3-4 pills left.  No need to c/b once Rx sent.    Told pt our office will call her back on 3/13/25 for update post inj.

## 2025-04-15 ENCOUNTER — RA CDI HCC (OUTPATIENT)
Dept: OTHER | Facility: HOSPITAL | Age: 70
End: 2025-04-15

## 2025-05-16 ENCOUNTER — CARE COORDINATION (OUTPATIENT)
Dept: OTHER | Facility: CLINIC | Age: 70
End: 2025-05-16

## 2025-05-16 NOTE — CARE COORDINATION
Ambulatory Care Coordination Note     5/16/2025 3:19 PM     Patient outreach attempt by this ACM today to offer care management services. ACM was unable to reach the patient by telephone today;   left voice message requesting a return phone call to this ACM.     ACM: Pedro Pablo Perdomo RN     Care Summary Note: Initial outreach to patient to offer CM services. HIPAA compliant voicemail message left with request for return call. ACM will continue to follow.    PCP/Specialist follow up:   Follow-up Information       Follow up With Specialties Details Why Contact Stella De Leon MD PCP Follow up on 6/3/2025    [address]   [address]   [place] [id number] [phone] (Work)   [phone] (Fax)                Follow Up:   Plan for next ACM outreach in approximately 1 week to complete:  - outreach attempt to offer care management services.

## 2025-05-22 ENCOUNTER — CARE COORDINATION (OUTPATIENT)
Dept: OTHER | Facility: CLINIC | Age: 70
End: 2025-05-22

## 2025-05-22 NOTE — CARE COORDINATION
Ambulatory Care Coordination Note     2025 4:29 PM     Patient Current Location:  [place]     This patient was received as a referral from Population health report .    Patient contacted the ACM by telephone. Patient would not verify name and  with ACM as identifiers. Provided introduction to self, and explanation of the ACM role.   Patient declined care management services at this time.          ACM: Pedro Pablo Perdmoo RN     Challenges to be reviewed by the provider   Additional needs identified to be addressed with provider No  none               Method of communication with provider: none.    Utilization: Initial Call - N/A    Care Summary Note: ACM received return call from patient. She states she was returning the call as she had missed call from AC's phone number. ACM provided introduction and explanation of program. Patient immediately states \"No, I'm not interested\" and disconnected the call. ACM signing off; program closed.    Offered patient enrollment in the Remote Patient Monitoring (RPM) program for in-home monitoring: Patient is not eligible for RPM program because: affiliate provider.     Assessments Completed:   None    Medications Reviewed:   Not completed during this call: patient declined    Advance Care Planning:   Not reviewed during this call     Care Planning:   Not completed during this call    PCP/Specialist follow up:       Follow Up:   No further Ambulatory Care Management follow-up scheduled at this time      ERIC Downing RN  Ambulatory Care Manager  Phone: [phone]  Email: [email or web address]

## 2025-08-15 DIAGNOSIS — M51.16 INTERVERTEBRAL DISC DISORDER WITH RADICULOPATHY OF LUMBAR REGION: ICD-10-CM

## 2025-08-18 RX ORDER — DICLOFENAC SODIUM 75 MG/1
75 TABLET, DELAYED RELEASE ORAL 2 TIMES DAILY PRN
Qty: 30 TABLET | Refills: 5 | Status: SHIPPED | OUTPATIENT
Start: 2025-08-18